# Patient Record
Sex: MALE | Race: WHITE | NOT HISPANIC OR LATINO | Employment: OTHER | URBAN - METROPOLITAN AREA
[De-identification: names, ages, dates, MRNs, and addresses within clinical notes are randomized per-mention and may not be internally consistent; named-entity substitution may affect disease eponyms.]

---

## 2022-03-25 LAB
CHOLESTEROL (EXTERNAL): 184 MG/DL (ref 140–199)
HDLC SERPL-MCNC: 30 MG/DL (ref 40–59)
LDL CHOLESTEROL CALCULATED (EXTERNAL): 89 MG/DL (ref 30–99)
TRIGLYCERIDES (EXTERNAL): 327 MG/DL (ref 10–149)

## 2022-11-28 ENCOUNTER — TRANSFERRED RECORDS (OUTPATIENT)
Dept: HEALTH INFORMATION MANAGEMENT | Facility: CLINIC | Age: 62
End: 2022-11-28

## 2023-05-04 ENCOUNTER — TRANSFERRED RECORDS (OUTPATIENT)
Dept: HEALTH INFORMATION MANAGEMENT | Facility: CLINIC | Age: 63
End: 2023-05-04

## 2023-12-14 ENCOUNTER — OFFICE VISIT (OUTPATIENT)
Dept: FAMILY MEDICINE | Facility: CLINIC | Age: 63
End: 2023-12-14
Payer: COMMERCIAL

## 2023-12-14 VITALS
BODY MASS INDEX: 32.86 KG/M2 | OXYGEN SATURATION: 97 % | RESPIRATION RATE: 24 BRPM | DIASTOLIC BLOOD PRESSURE: 85 MMHG | HEIGHT: 68 IN | TEMPERATURE: 97.5 F | SYSTOLIC BLOOD PRESSURE: 136 MMHG | HEART RATE: 86 BPM | WEIGHT: 216.8 LBS

## 2023-12-14 DIAGNOSIS — E11.42 TYPE 2 DIABETES MELLITUS WITH DIABETIC POLYNEUROPATHY, WITH LONG-TERM CURRENT USE OF INSULIN (H): Primary | ICD-10-CM

## 2023-12-14 DIAGNOSIS — I25.10 CORONARY ARTERY DISEASE DUE TO LIPID RICH PLAQUE: ICD-10-CM

## 2023-12-14 DIAGNOSIS — E78.5 HYPERLIPIDEMIA LDL GOAL <100: ICD-10-CM

## 2023-12-14 DIAGNOSIS — Z79.4 TYPE 2 DIABETES MELLITUS WITH DIABETIC POLYNEUROPATHY, WITH LONG-TERM CURRENT USE OF INSULIN (H): Primary | ICD-10-CM

## 2023-12-14 DIAGNOSIS — I10 HYPERTENSION GOAL BP (BLOOD PRESSURE) < 140/90: ICD-10-CM

## 2023-12-14 DIAGNOSIS — I25.83 CORONARY ARTERY DISEASE DUE TO LIPID RICH PLAQUE: ICD-10-CM

## 2023-12-14 DIAGNOSIS — M1A.9XX0 CHRONIC GOUT WITHOUT TOPHUS, UNSPECIFIED CAUSE, UNSPECIFIED SITE: ICD-10-CM

## 2023-12-14 DIAGNOSIS — K21.00 GASTROESOPHAGEAL REFLUX DISEASE WITH ESOPHAGITIS, UNSPECIFIED WHETHER HEMORRHAGE: ICD-10-CM

## 2023-12-14 DIAGNOSIS — R06.02 SOB (SHORTNESS OF BREATH): ICD-10-CM

## 2023-12-14 PROCEDURE — 91320 SARSCV2 VAC 30MCG TRS-SUC IM: CPT | Performed by: FAMILY MEDICINE

## 2023-12-14 PROCEDURE — 90471 IMMUNIZATION ADMIN: CPT | Performed by: FAMILY MEDICINE

## 2023-12-14 PROCEDURE — 99204 OFFICE O/P NEW MOD 45 MIN: CPT | Mod: 25 | Performed by: FAMILY MEDICINE

## 2023-12-14 PROCEDURE — 90686 IIV4 VACC NO PRSV 0.5 ML IM: CPT | Performed by: FAMILY MEDICINE

## 2023-12-14 PROCEDURE — 90480 ADMN SARSCOV2 VAC 1/ONLY CMP: CPT | Performed by: FAMILY MEDICINE

## 2023-12-14 RX ORDER — INSULIN ASPART 100 [IU]/ML
INJECTION, SOLUTION INTRAVENOUS; SUBCUTANEOUS
COMMUNITY
Start: 2023-05-31 | End: 2024-01-29

## 2023-12-14 RX ORDER — GLIPIZIDE 5 MG/1
15 TABLET, FILM COATED, EXTENDED RELEASE ORAL DAILY
COMMUNITY
Start: 2023-12-13 | End: 2024-09-05

## 2023-12-14 RX ORDER — DULAGLUTIDE 3 MG/.5ML
INJECTION, SOLUTION SUBCUTANEOUS
COMMUNITY
Start: 2023-10-11 | End: 2024-02-23

## 2023-12-14 RX ORDER — ASPIRIN 81 MG/1
81 TABLET ORAL DAILY
COMMUNITY

## 2023-12-14 RX ORDER — ISOSORBIDE MONONITRATE 30 MG/1
TABLET, EXTENDED RELEASE ORAL
COMMUNITY
Start: 2023-12-13 | End: 2024-01-29

## 2023-12-14 RX ORDER — GABAPENTIN 400 MG/1
CAPSULE ORAL
COMMUNITY
Start: 2023-12-13 | End: 2024-04-21

## 2023-12-14 RX ORDER — PREGABALIN 150 MG/1
CAPSULE ORAL
COMMUNITY
Start: 2023-12-13

## 2023-12-14 RX ORDER — DULOXETIN HYDROCHLORIDE 60 MG/1
CAPSULE, DELAYED RELEASE ORAL
COMMUNITY
Start: 2023-12-13 | End: 2024-01-29

## 2023-12-14 RX ORDER — FENOFIBRATE 145 MG/1
TABLET, COATED ORAL
COMMUNITY
Start: 2023-12-13 | End: 2024-01-29

## 2023-12-14 RX ORDER — PEN NEEDLE, DIABETIC 31 GX3/16"
NEEDLE, DISPOSABLE MISCELLANEOUS
COMMUNITY
Start: 2023-10-01 | End: 2024-01-29

## 2023-12-14 RX ORDER — FLURBIPROFEN SODIUM 0.3 MG/ML
SOLUTION/ DROPS OPHTHALMIC
COMMUNITY
Start: 2023-12-13 | End: 2024-09-05

## 2023-12-14 RX ORDER — ATORVASTATIN CALCIUM 80 MG/1
TABLET, FILM COATED ORAL
COMMUNITY
Start: 2023-12-13 | End: 2024-01-29

## 2023-12-14 RX ORDER — LORATADINE 10 MG/1
TABLET ORAL
COMMUNITY
Start: 2023-12-13

## 2023-12-14 RX ORDER — INSULIN GLARGINE 100 [IU]/ML
50 INJECTION, SOLUTION SUBCUTANEOUS 2 TIMES DAILY
COMMUNITY
Start: 2023-12-13 | End: 2024-01-29

## 2023-12-14 RX ORDER — ALBUTEROL SULFATE 90 UG/1
AEROSOL, METERED RESPIRATORY (INHALATION)
COMMUNITY
Start: 2023-12-13 | End: 2024-01-29

## 2023-12-14 RX ORDER — CYANOCOBALAMIN 1000 UG/ML
INJECTION, SOLUTION INTRAMUSCULAR; SUBCUTANEOUS
COMMUNITY
Start: 2023-12-13

## 2023-12-14 RX ORDER — ALLOPURINOL 100 MG/1
TABLET ORAL
COMMUNITY
Start: 2023-12-13 | End: 2024-01-29

## 2023-12-14 ASSESSMENT — PAIN SCALES - GENERAL: PAINLEVEL: SEVERE PAIN (7)

## 2023-12-14 NOTE — PROGRESS NOTES
ASSESSMENT / PLAN:  (E11.42,  Z79.4) Type 2 diabetes mellitus with diabetic polyneuropathy, with long-term current use of insulin (H)  (primary encounter diagnosis)  Comment: needs help  Plan: AMB Adult Diabetes Educator Referral        Will ask for dm ed help and possible continuous monitoring. Recheck in 4 months  For labs/etc    (I25.10,  I25.83) Coronary artery disease due to lipid rich plaque  Comment: some shortness of breath and needs new cardiology  Plan: Adult Cardiology Eval  Referral        Worsening shortness of breath/ chest pain to er. Tighter dm control    (R06.02) SOB (shortness of breath)  Comment: possibly out of shape  Plan: Adult Cardiology Eval  Referral        To ER if worse     (M1A.9XX0) Chronic gout without tophus, unspecified cause, unspecified site  Comment: stable  Plan: continue allopurinol    (I10) Hypertension goal BP (blood pressure) < 140/90  Comment: a little high  Plan: self-monitor. Continue meds    (E78.5) Hyperlipidemia LDL goal <100  Comment: on lipitor  Plan: Recheck in 4 months      (K21.00) Gastroesophageal reflux disease with esophagitis, unspecified whether hemorrhage  Comment: stable  Plan: avoid pop/ ALCOHOL       Tania Hurtado is a 63 year old, presenting for the following health issues:  New to clinic/myself.  History obesity, dm, htn, neuropathy, gerd, gout and high cholesterol.    and now living with son in [place]. From [facility] [place].  Dm ed in past. Outside blood sugars 200s. No <50 or LOSS OF CONSCIENCE.  Eye exam one year ago - no changes. Dm 10 years.  History MI/cva in past 10 years ago.   Chronic shortness of breath issues. No acute chest pain.   Needs new cardiology. No nausea, vomiting or diarrhea or black/bloody stools.   No urien changes or hematuria.  Emotionally doing ok. 4 kids. No gout in 8months gerd stable.  Meds just filled. Labs 3months.   Moved one month ago.   Establish Care and Recheck Medication      12/14/2023      "9:22 AM   Additional Questions   Roomed by MCKENNA Ocampo CMA       History of Present Illness       Diabetes:   He presents for follow up of diabetes.  He is checking home blood glucose four or more times daily.   He checks blood glucose before and after meals.  Blood glucose is sometimes over 200 and never under 70. He is aware of hypoglycemia symptoms including dizziness.    He has no concerns regarding his diabetes at this time.  He is having numbness in feet, burning in feet and excessive thirst.            Reason for visit:  Check up    He eats 2-3 servings of fruits and vegetables daily.He consumes 0 sweetened beverage(s) daily.He exercises with enough effort to increase his heart rate 20 to 29 minutes per day.  He exercises with enough effort to increase his heart rate 5 days per week.   He is taking medications regularly.           Objective    /85   Pulse 86   Temp 97.5  F (36.4  C) (Oral)   Resp 24   Ht 1.727 m (5' 8\")   Wt 98.3 kg (216 lb 12.8 oz)   SpO2 97%   BMI 32.96 kg/m     Physical Exam   GENERAL: healthy, alert and no distress  EYES: Eyes grossly normal to inspection, PERRL and conjunctivae and sclerae normal  NECK: no adenopathy, no asymmetry, masses, or scars and thyroid normal to palpation  RESP: lungs clear to auscultation - no rales, rhonchi or wheezes  CV: regular rate and rhythm, normal S1 S2, no S3 or S4, no murmur, click or rub, no peripheral edema and peripheral pulses strong  ABDOMEN: soft, nontender, no hepatosplenomegaly, no masses and bowel sounds normal  MS: no gross musculoskeletal defects noted, no edema  PSYCH: mentation appears normal, affect normal/bright                "

## 2023-12-31 ENCOUNTER — HEALTH MAINTENANCE LETTER (OUTPATIENT)
Age: 63
End: 2023-12-31

## 2024-01-04 ENCOUNTER — OFFICE VISIT (OUTPATIENT)
Dept: CARDIOLOGY | Facility: CLINIC | Age: 64
End: 2024-01-04
Attending: FAMILY MEDICINE
Payer: COMMERCIAL

## 2024-01-04 VITALS
SYSTOLIC BLOOD PRESSURE: 168 MMHG | HEART RATE: 86 BPM | BODY MASS INDEX: 32.99 KG/M2 | DIASTOLIC BLOOD PRESSURE: 93 MMHG | WEIGHT: 217 LBS | OXYGEN SATURATION: 95 %

## 2024-01-04 DIAGNOSIS — R06.02 SOB (SHORTNESS OF BREATH): ICD-10-CM

## 2024-01-04 DIAGNOSIS — I25.10 CORONARY ARTERY DISEASE DUE TO LIPID RICH PLAQUE: ICD-10-CM

## 2024-01-04 DIAGNOSIS — I25.83 CORONARY ARTERY DISEASE DUE TO LIPID RICH PLAQUE: ICD-10-CM

## 2024-01-04 PROCEDURE — 93000 ELECTROCARDIOGRAM COMPLETE: CPT | Performed by: INTERNAL MEDICINE

## 2024-01-04 PROCEDURE — 99204 OFFICE O/P NEW MOD 45 MIN: CPT | Performed by: INTERNAL MEDICINE

## 2024-01-04 NOTE — LETTER
1/4/2024      RE: Bryant Buck  [address] [address] [facility] [facility] [address]       Dear Colleague,    Thank you for the opportunity to participate in the care of your patient, Bryant Buck, at the [facility] . Please see a copy of my visit note below.       SUBJECTIVE:  Bryant Buck is a 63 year old male who presents for establishing cardiac care.  Patient is moving from [place] to [place] to live with his son and daughter-in-law.  According the patient about 4 years ago he was admitted to a small hospital in [place] and he was told that he had a heart attack.  No intervention or any other action was taken.  Patient was discharged home next day.  Subsequently patient met her cardiologist but no definitive diagnosis was made.  Patient's cardiac risk factors are smoking which he quit 10 years ago, do have COPD, type 2 diabetes for over 8 years hypertension and hyperlipidemia.  Family history is unavailable as patient went through [employer] and the parents he thought and not his real parents.  His only current symptom is dyspnea on exertion which is progressive.  For the past 3 years  Patient is not very active due to diabetes related peripheral neuropathy.    Patient Active Problem List    Diagnosis Date Noted     Chronic gout without tophus, unspecified cause, unspecified site 12/14/2023     Priority: Medium     Coronary artery disease due to lipid rich plaque 12/14/2023     Priority: Medium     Hypertension goal BP (blood pressure) < 140/90 12/14/2023     Priority: Medium     Hyperlipidemia LDL goal <100 12/14/2023     Priority: Medium     Gastroesophageal reflux disease with esophagitis, unspecified whether hemorrhage 12/14/2023     Priority: Medium    .  Current Outpatient Medications   Medication Sig     allopurinol (ZYLOPRIM) 100 MG tablet      aspirin 81 MG EC tablet Take 81 mg by mouth daily     atorvastatin  (LIPITOR) 80 MG tablet      B-D U/F insulin pen needle      CONTOUR NEXT TEST test strip      cyanocobalamin (CYANOCOBALAMIN) 1000 MCG/ML injection      DULoxetine (CYMBALTA) 60 MG capsule      fenofibrate (TRICOR) 145 MG tablet      gabapentin (NEURONTIN) 400 MG capsule      glipiZIDE (GLUCOTROL XL) 5 MG 24 hr tablet      isosorbide mononitrate (IMDUR) 30 MG 24 hr tablet      LANTUS SOLOSTAR 100 UNIT/ML soln      loratadine (CLARITIN) 10 MG tablet      metFORMIN (GLUCOPHAGE) 1000 MG tablet      NOVOLOG FLEXPEN 100 UNIT/ML soln INJECT 0-50 UNITS SUBQ THREE TIMES DAILY AS NEEDED PER SLIDING SCALE     omeprazole (PRILOSEC) 20 MG DR capsule      pregabalin (LYRICA) 150 MG capsule      SURE COMFORT PEN NEEDLES 31G X 8 MM miscellaneous USE TO INJECT INSULIN, SEVEN PER DAY     TRULICITY 3 MG/0.5ML SOPN INJECT 3MG SUBCUTANEOUSLY EVERY WEEK     VENTOLIN  (90 Base) MCG/ACT inhaler      No current facility-administered medications for this visit.     Past Medical History:   Diagnosis Date     COPD exacerbation (H)      Coronary artery disease      Diabetes (H)      History of stroke     2-3 strokes     Hyperlipidemia      Hypertension      Myocardial infarction (H)      Neuropathy      Sleep apnea      No past surgical history on file.  No Known Allergies  Social History     Socioeconomic History     Marital status:      Spouse name: Not on file     Number of children: Not on file     Years of education: Not on file     Highest education level: Not on file   Occupational History     Not on file   Tobacco Use     Smoking status: Former     Types: Cigarettes     Quit date:      Years since quittin.0     Smokeless tobacco: Never   Vaping Use     Vaping Use: Never used   Substance and Sexual Activity     Alcohol use: Not on file     Drug use: Not on file     Sexual activity: Not on file   Other Topics Concern     Not on file   Social History Narrative     Not on file     Social Determinants of Health      Financial Resource Strain: Low Risk  (12/14/2023)    Financial Resource Strain      Within the past 12 months, have you or your family members you live with been unable to get utilities (heat, electricity) when it was really needed?: No   Food Insecurity: Low Risk  (12/14/2023)    Food Insecurity      Within the past 12 months, did you worry that your food would run out before you got money to buy more?: No      Within the past 12 months, did the food you bought just not last and you didn t have money to get more?: Patient refused   Transportation Needs: Low Risk  (12/14/2023)    Transportation Needs      Within the past 12 months, has lack of transportation kept you from medical appointments, getting your medicines, non-medical meetings or appointments, work, or from getting things that you need?: No   Physical Activity: Not on file   Stress: Not on file   Social Connections: Not on file   Interpersonal Safety: Low Risk  (12/14/2023)    Interpersonal Safety      Do you feel physically and emotionally safe where you currently live?: Yes      Within the past 12 months, have you been hit, slapped, kicked or otherwise physically hurt by someone?: No      Within the past 12 months, have you been humiliated or emotionally abused in other ways by your partner or ex-partner?: No   Housing Stability: Low Risk  (12/14/2023)    Housing Stability      Do you have housing? : Yes      Are you worried about losing your housing?: No     No family history on file.       REVIEW OF SYSTEMS:  General: negative, fever, chills, night sweats  Skin: negative, acne, rash, and scaling  Eyes: negative, double vision, eye pain, and photophobia  Ears/Nose/Throat: negative, nasal congestion, and purulent rhinorrhea  Respiratory: No dyspnea on exertion, No cough, No hemoptysis, and negative  Cardiovascular: negative, palpitations, tachycardia, irregular heart beat, chest pain, exertional chest pain or pressure, paroxysmal nocturnal dyspnea,  dyspnea on exertion, and orthopnea         OBJECTIVE:  Blood pressure (!) 168/93, pulse 86, weight 98.4 kg (217 lb), SpO2 95%.  General Appearance: alert and no distress  Head: Normocephalic. No masses, lesions, tenderness or abnormalities  Eyes: conjuctiva clear, PERRL, EOM intact  Ears: External ears normal. Canals clear. TM's normal.  Nose: Nares normal  Mouth: normal  Neck: Supple, no cervical adenopathy, no thyromegaly  Lungs: clear to auscultation  Cardiac: regular rate and rhythm, normal S1 and S2, no murmur     ASSESSMENT/PLAN:  Patient here for establishing cardiac care.  He is moving from [place] to [place] to live with his son and daughter-in-law.  She gives a history that 4 years ago he was admitted to the very small [facility] and he was told that he had a heart attack.  No angiogram or any other testing was done at that time.  Patient was kept overnight and discharged home the next day.  Subsequently patient met a cardiologist but results of the evaluation is unavailable and patient do not know  Cardiac risk factors of hypertension, hyperlipidemia and type 2 diabetes severe peripheral neuropathy related to diabetes.  According to him he is a diabetic for past 8 years.  He quit smoking 10 years ago and do have COPD.  No significant records are available.  Today's EKG at the clinic showed normal sinus rhythm.  Low voltage otherwise unremarkable.  Patient need all baseline evaluation.  Will plan for an echocardiogram, Lexiscan to evaluate for prior history of MI and any ischemia as well as evaluation of dyspnea on exertion.  Also will check a lipid profile and also LFTs.  Patient will be contacted with the test result.  He is advised to keep an yearly follow-up.  Total visit duration 45 minutes.  This includes face-to-face interview, physical exam, chart review, review of available lab results, today's EKG and documentation.      Please do not hesitate to contact me if you have any  questions/concerns.     Sincerely,     MCKENNA Shannon MD

## 2024-01-04 NOTE — PATIENT INSTRUCTIONS
Thank you for coming to the [facility]; please note the following instructions:    1. Echocardiogram to evaluate heart structure and heart pumping function    2.  Lexiscan nuclear stress test to rule out coronary artery blockage    3.  Fasting labs to check cholesterol and liver function    4.  Results will determine if additional testing is needed otherwise follow-up in 1 year        If you have any questions regarding your visit please contact your care team:     Cardiology  Telephone Number   Charlene PALACIOS., RN  Herminia PFEIFFER, RN  Jennifer KELLY, RN  Diann MURRAY, DONOVAN AMNTILLA, DONOVAN KHALIL, Visit Facilitator  Nicol MOREL [facility] [phone] (option 1)   For scheduling appts:     [phone] (select option 1)       For the Device Clinic (Pacemakers and ICD's)  RN's :  Stephanie Jernigan   During business hours: [phone]    *After business hours:  [phone] (select option 4)      Normal test result notifications will be released via [employer] or mailed within 7 business days.  All other test results, will be communicated via telephone once reviewed by your cardiologist.    If you need a medication refill please contact your pharmacy.  Please allow 3 business days for your refill to be completed.    As always, thank you for trusting us with your health care needs!

## 2024-01-04 NOTE — PROGRESS NOTES
SUBJECTIVE:  Bryant Buck is a 63 year old male who presents for establishing cardiac care.  Patient is moving from [place] to [place] to live with his son and daughter-in-law.  According the patient about 4 years ago he was admitted to a small hospital in [place] and he was told that he had a heart attack.  No intervention or any other action was taken.  Patient was discharged home next day.  Subsequently patient met her cardiologist but no definitive diagnosis was made.  Patient's cardiac risk factors are smoking which he quit 10 years ago, do have COPD, type 2 diabetes for over 8 years hypertension and hyperlipidemia.  Family history is unavailable as patient went through [employer] and the parents he thought and not his real parents.  His only current symptom is dyspnea on exertion which is progressive.  For the past 3 years  Patient is not very active due to diabetes related peripheral neuropathy.    Patient Active Problem List    Diagnosis Date Noted    Chronic gout without tophus, unspecified cause, unspecified site 12/14/2023     Priority: Medium    Coronary artery disease due to lipid rich plaque 12/14/2023     Priority: Medium    Hypertension goal BP (blood pressure) < 140/90 12/14/2023     Priority: Medium    Hyperlipidemia LDL goal <100 12/14/2023     Priority: Medium    Gastroesophageal reflux disease with esophagitis, unspecified whether hemorrhage 12/14/2023     Priority: Medium    .  Current Outpatient Medications   Medication Sig    allopurinol (ZYLOPRIM) 100 MG tablet     aspirin 81 MG EC tablet Take 81 mg by mouth daily    atorvastatin (LIPITOR) 80 MG tablet     B-D U/F insulin pen needle     CONTOUR NEXT TEST test strip     cyanocobalamin (CYANOCOBALAMIN) 1000 MCG/ML injection     DULoxetine (CYMBALTA) 60 MG capsule     fenofibrate (TRICOR) 145 MG tablet     gabapentin (NEURONTIN) 400 MG capsule     glipiZIDE (GLUCOTROL XL) 5 MG 24 hr tablet     isosorbide mononitrate (IMDUR) 30 MG  24 hr tablet     LANTUS SOLOSTAR 100 UNIT/ML soln     loratadine (CLARITIN) 10 MG tablet     metFORMIN (GLUCOPHAGE) 1000 MG tablet     NOVOLOG FLEXPEN 100 UNIT/ML soln INJECT 0-50 UNITS SUBQ THREE TIMES DAILY AS NEEDED PER SLIDING SCALE    omeprazole (PRILOSEC) 20 MG DR capsule     pregabalin (LYRICA) 150 MG capsule     SURE COMFORT PEN NEEDLES 31G X 8 MM miscellaneous USE TO INJECT INSULIN, SEVEN PER DAY    TRULICITY 3 MG/0.5ML SOPN INJECT 3MG SUBCUTANEOUSLY EVERY WEEK    VENTOLIN  (90 Base) MCG/ACT inhaler      No current facility-administered medications for this visit.     Past Medical History:   Diagnosis Date    COPD exacerbation (H)     Coronary artery disease     Diabetes (H)     History of stroke     2-3 strokes    Hyperlipidemia     Hypertension     Myocardial infarction (H)     Neuropathy     Sleep apnea      No past surgical history on file.  No Known Allergies  Social History     Socioeconomic History    Marital status:      Spouse name: Not on file    Number of children: Not on file    Years of education: Not on file    Highest education level: Not on file   Occupational History    Not on file   Tobacco Use    Smoking status: Former     Types: Cigarettes     Quit date:      Years since quittin.0    Smokeless tobacco: Never   Vaping Use    Vaping Use: Never used   Substance and Sexual Activity    Alcohol use: Not on file    Drug use: Not on file    Sexual activity: Not on file   Other Topics Concern    Not on file   Social History Narrative    Not on file     Social Determinants of Health     Financial Resource Strain: Low Risk  (2023)    Financial Resource Strain     Within the past 12 months, have you or your family members you live with been unable to get utilities (heat, electricity) when it was really needed?: No   Food Insecurity: Low Risk  (2023)    Food Insecurity     Within the past 12 months, did you worry that your food would run out before you got money to  buy more?: No     Within the past 12 months, did the food you bought just not last and you didn t have money to get more?: Patient refused   Transportation Needs: Low Risk  (12/14/2023)    Transportation Needs     Within the past 12 months, has lack of transportation kept you from medical appointments, getting your medicines, non-medical meetings or appointments, work, or from getting things that you need?: No   Physical Activity: Not on file   Stress: Not on file   Social Connections: Not on file   Interpersonal Safety: Low Risk  (12/14/2023)    Interpersonal Safety     Do you feel physically and emotionally safe where you currently live?: Yes     Within the past 12 months, have you been hit, slapped, kicked or otherwise physically hurt by someone?: No     Within the past 12 months, have you been humiliated or emotionally abused in other ways by your partner or ex-partner?: No   Housing Stability: Low Risk  (12/14/2023)    Housing Stability     Do you have housing? : Yes     Are you worried about losing your housing?: No     No family history on file.       REVIEW OF SYSTEMS:  General: negative, fever, chills, night sweats  Skin: negative, acne, rash, and scaling  Eyes: negative, double vision, eye pain, and photophobia  Ears/Nose/Throat: negative, nasal congestion, and purulent rhinorrhea  Respiratory: No dyspnea on exertion, No cough, No hemoptysis, and negative  Cardiovascular: negative, palpitations, tachycardia, irregular heart beat, chest pain, exertional chest pain or pressure, paroxysmal nocturnal dyspnea, dyspnea on exertion, and orthopnea         OBJECTIVE:  Blood pressure (!) 168/93, pulse 86, weight 98.4 kg (217 lb), SpO2 95%.  General Appearance: alert and no distress  Head: Normocephalic. No masses, lesions, tenderness or abnormalities  Eyes: conjuctiva clear, PERRL, EOM intact  Ears: External ears normal. Canals clear. TM's normal.  Nose: Nares normal  Mouth: normal  Neck: Supple, no cervical  adenopathy, no thyromegaly  Lungs: clear to auscultation  Cardiac: regular rate and rhythm, normal S1 and S2, no murmur     ASSESSMENT/PLAN:  Patient here for establishing cardiac care.  He is moving from [place] to [place] to live with his son and daughter-in-law.  She gives a history that 4 years ago he was admitted to the very small [facility] and he was told that he had a heart attack.  No angiogram or any other testing was done at that time.  Patient was kept overnight and discharged home the next day.  Subsequently patient met a cardiologist but results of the evaluation is unavailable and patient do not know  Cardiac risk factors of hypertension, hyperlipidemia and type 2 diabetes severe peripheral neuropathy related to diabetes.  According to him he is a diabetic for past 8 years.  He quit smoking 10 years ago and do have COPD.  No significant records are available.  Today's EKG at the clinic showed normal sinus rhythm.  Low voltage otherwise unremarkable.  Patient need all baseline evaluation.  Will plan for an echocardiogram, Lexiscan to evaluate for prior history of MI and any ischemia as well as evaluation of dyspnea on exertion.  Also will check a lipid profile and also LFTs.  Patient will be contacted with the test result.  He is advised to keep an yearly follow-up.  Total visit duration 45 minutes.  This includes face-to-face interview, physical exam, chart review, review of available lab results, today's EKG and documentation.

## 2024-01-04 NOTE — NURSING NOTE
"Chief Complaint   Patient presents with    Coronary Artery Disease    Shortness of Breath     New general cardiology for CAD due to lipid rich plaque and SOB       Initial BP (!) 168/93 (BP Location: Left arm, Patient Position: Chair, Cuff Size: Adult Regular)   Pulse 86   Wt 98.4 kg (217 lb)   SpO2 95%   BMI 32.99 kg/m   Estimated body mass index is 32.99 kg/m  as calculated from the following:    Height as of 12/14/23: 1.727 m (5' 8\").    Weight as of this encounter: 98.4 kg (217 lb)..  BP completed using cuff size: DONOVAN Hernandez    "

## 2024-01-05 ENCOUNTER — VIRTUAL VISIT (OUTPATIENT)
Dept: EDUCATION SERVICES | Facility: CLINIC | Age: 64
End: 2024-01-05
Attending: FAMILY MEDICINE
Payer: COMMERCIAL

## 2024-01-05 DIAGNOSIS — E11.42 TYPE 2 DIABETES MELLITUS WITH DIABETIC POLYNEUROPATHY, WITH LONG-TERM CURRENT USE OF INSULIN (H): ICD-10-CM

## 2024-01-05 DIAGNOSIS — Z79.4 TYPE 2 DIABETES MELLITUS WITH DIABETIC POLYNEUROPATHY, WITH LONG-TERM CURRENT USE OF INSULIN (H): ICD-10-CM

## 2024-01-05 PROCEDURE — G0108 DIAB MANAGE TRN  PER INDIV: HCPCS | Mod: 95 | Performed by: DIETITIAN, REGISTERED

## 2024-01-05 RX ORDER — BLOOD-GLUCOSE SENSOR
1 EACH MISCELLANEOUS
Qty: 2 EACH | Refills: 5 | Status: SHIPPED | OUTPATIENT
Start: 2024-01-05 | End: 2024-02-23

## 2024-01-05 NOTE — PROGRESS NOTES
"Diabetes Self-Management Education & Support    Presents for: Individual review    Type of service:  Video Visit    If the video visit is dropped, the video visit invitation should be resent by: Text to cell phone: [phone]    Originating Location (pt. Location): Home  Distant Location (provider location): Offsite  Mode of Communication:  Video Conference via [employer]    Video Start Time:  10:00  Video End Time (time video stopped): 10:45    How would patient like to obtain AVS? MyChart    Date Breakfast  Lunch  Dinner  Bedtime    Before After Before After Before After    1/5 355         1/4 356  304 311 332 413 330   1/3 272  230 167 264 142 226   1/2 247  227 247 221 247 227   1/1 233  226 373 314 225    12/31 200  212 220 217 214 224   12/30 185  229 264 274 227        ASSESSMENT:  Pt reports taking 100 units Lantus once daily (80 and 20 units in separate injections). Has sliding scale Novolog + Glipize + Metformin + Trulicity (waiting for prior auth or refill). Has had extensive DM ed in the past, but states he is \"insulin resistant\". Has been losing weight, and reports blood sugars now improved from 400- 600 mg/dl. Recommend pt split Lantus to 50 units morning and bedtime. Also recommend continuous glucose monitoring, Susu 3 CGM ordered for pt today. Pt has been eating smaller portions, eats gluten free and lower carb. Follow up planned for review of BG data. Will then consider whether Novolog before each meal may be beneficial.     CGM-specific education:   Freestyle Susu sensor: insertion technique, sensor site location and rotation, insulin administration in relation to sensor placement, sensor wear, reasons to remove sensor (MRI, CT, diathermy), Vitamin C & Aspirin effects on sensor, Susu Burton: frequency of scanning sensor, length of time data is visible, use of built in glucose meter, Precision X-tra test strips, Use of trends and graphs for pattern management and problem solving, Dosing " "insulin based on sensor glucose results, and [employer] software       Pt verbalized understanding of concepts discussed and recommendations provided today.    Continue education with the following diabetes management concepts: Monitoring, Taking Medication, and Problem Solving      PLAN    Topics to cover at upcoming visits: Monitoring and Problem Solving    Follow-up: 2 month    See Care Plan for co-developed, patient-state behavior change goals.  AVS provided for patient today.    Education Materials Provided:  Referred to [email or web address]      SUBJECTIVE/OBJECTIVE:  Presents for: Individual review  Accompanied by: Self  Diabetes education in the past 24mo: Yes  Focus of Visit: Problem Solving  Diabetes type: Type 2  Date of diagnosis: 10 yrs ago  Disease course: Improving  How confident are you filling out medical forms by yourself:: Not Assessed  Other concerns:: None  Cultural Influences/Ethnic Background:  Not  or     Diabetes Symptoms & Complications:  Weight trend: Decreasing  Complications assessed today?: Yes  Peripheral neuropathy: Yes    Patient Problem List and Family Medical History reviewed for relevant medical history, current medical status, and diabetes risk factors.    Vitals:  There were no vitals taken for this visit.  Estimated body mass index is 32.99 kg/m  as calculated from the following:    Height as of 12/14/23: 1.727 m (5' 8\").    Weight as of 1/4/24: 98.4 kg (217 lb).   Last 3 BP:   BP Readings from Last 3 Encounters:   01/04/24 (!) 168/93   12/14/23 136/85       History   Smoking Status    Former    Types: Cigarettes    Quit date: 2013   Smokeless Tobacco    Never       Labs:  No results found for: \"A1C\", \"HEMOGLOBINA1\"  No results found for: \"GLC\"  No results found for: \"LDL\"  No results found for: \"HDL\"]  No results found for: \"GFRESTIMATED\"  No results found for: \"GFRESTBLACK\"  No results found for: \"CR\"  No results found for: \"MICROALBUMIN\"    Healthy Eating:  Healthy Eating " Assessed Today: Yes  Meal planning/habits: Avoiding sweets, Other  Meals include: Breakfast, Lunch, Dinner  Breakfast: 1 slice gluten free toast with peanut butter  Lunch: salad or other vegetable,  apple or peach  Dinner: lentils, vegetables or salad  Beverages: Water, Tea  Has patient met with a dietitian in the past?: Yes    Being Active:  Being Active Assessed Today: Yes  Exercise:: Currently not exercising  Barrier to exercise: Physical limitation    Monitoring:  Monitoring Assessed Today: Yes  Times checking blood sugar at home (number): 5+  Times checking blood sugar at home (per): Day  Blood glucose trend: Decreasing    Taking Medications:  Diabetes Medication(s)       Biguanides       metFORMIN (GLUCOPHAGE) 1000 MG tablet        Insulin       LANTUS SOLOSTAR 100 UNIT/ML soln      NOVOLOG FLEXPEN 100 UNIT/ML soln INJECT 0-50 UNITS SUBQ THREE TIMES DAILY AS NEEDED PER SLIDING SCALE       Sulfonylureas       glipiZIDE (GLUCOTROL XL) 5 MG 24 hr tablet        Incretin Mimetic Agents       TRULICITY 3 MG/0.5ML SOPN INJECT 3MG SUBCUTANEOUSLY EVERY WEEK          Taking Medication Assessed Today: Yes  Current Treatments: Insulin Injections, Non-insulin Injectables, Oral Medication (taken by mouth)    Problem Solving:  Problem Solving Assessed Today: Yes  Is the patient at risk for hypoglycemia?: Yes  Hypoglycemia Frequency: Never    Reducing Risks:  Reducing Risks Assessed Today: No    Healthy Coping:  Healthy Coping Assessed Today: Yes  Emotional response to diabetes: Concern for health and well-being, Ready to learn  Stage of change: ACTION (Actively working towards change)  Patient Activation Measure Survey Score:       No data to display                Care Plan and Education Provided:  Care Plan: Diabetes   Updates made by Gretel Ferreira RD since 1/5/2024 12:00 AM        Problem: HbA1C Not In Goal         Goal: Establish Regular Follow-Ups with PCP         Task: Discuss with PCP the recommended timing for  patient's next follow up visit(s)    Responsible User: Gretel Ferreira RD        Task: Discuss schedule for PCP visits with patient    Responsible User: Gretel Ferreira RD        Goal: Get HbA1C Level in Goal         Task: Educate patient on diabetes education self-management topics    Responsible User: Gretel Ferreira RD        Task: Educate patient on benefits of regular glucose monitoring    Responsible User: Gretel Ferreira RD        Task: Refer patient to appropriate extended care team member, as needed (Medication Therapy Management, Behavioral Health, Physical Therapy, etc.)    Responsible User: Gretel Ferreira RD        Task: Discuss diabetes treatment plan with patient    Responsible User: Gretel Ferreira RD        Problem: Diabetes Self-Management Education Needed to Optimize Self-Care Behaviors         Goal: Understand diabetes pathophysiology and disease progression         Task: Provide education on diabetes pathophysiology and disease progression specfic to patient's diabetes type    Responsible User: Gretel Ferreira RD        Goal: Healthy Eating - follow a healthy eating pattern for diabetes         Task: Provide education on portion control and consistency in amount, composition and timing of food intake    Responsible User: Gretel Ferreira RD        Task: Provide education on managing carbohydrate intake (carbohydrate counting, plate planning method, etc.)    Responsible User: Gretel Ferreira RD        Task: Provide education on weight management    Responsible User: Gretel Ferreira RD        Task: Provide education on heart healthy eating    Responsible User: Gretel Ferreira RD        Task: Provide education on eating out    Responsible User: Gretel Ferreira RD        Task: Develop individualized healthy eating plan with patient    Responsible User: Gretel Ferreira RD        Goal: Being Active - get regular physical activity, working up to at least 150 minutes per week         Task:  Provide education on relationship of activity to glucose and precautions to take if at risk for low glucose    Responsible User: Gretel Ferreira RD        Task: Discuss barriers to physical activity with patient    Responsible User: Gretel Ferreira RD        Task: Develop physical activity plan with patient    Responsible User: Gretel Ferreira RD        Task: Explore community resources including walking groups, assistance programs, and home videos    Responsible User: Gretel Ferreira RD        Goal: Monitoring - monitor glucose and ketones as directed         Task: Provide education on blood glucose monitoring (purpose, proper technique, frequency, glucose targets, interpreting results, when to use glucose control solution, sharps disposal)    Responsible User: Gretel Ferreira RD        Task: Provide education on continuous glucose monitoring (sensor placement, use of seema or /reader, understanding glucose trends, alerts and alarms, differences between sensor glucose and blood glucose) Completed 1/5/2024   Responsible User: Gretel Ferreira RD        Task: Provide education on ketone monitoring (when to monitor, frequency, etc.)    Responsible User: Gretel Ferreira RD        Goal: Taking Medication - patient is consistently taking medications as directed         Task: Provide education on action of prescribed medication, including when to take and possible side effects    Responsible User: Gretel Ferreira RD        Task: Provide education on insulin and injectable diabetes medications, including administration, storage, site selection and rotation for injection sites Completed 1/5/2024   Responsible User: Gretel Ferreira RD        Task: Discuss barriers to medication adherence with patient and provide management technique ideas as appropriate    Responsible User: Gretel Ferreira RD        Task: Provide education on frequency and refill details of medications    Responsible User: Gretel Ferreira RD         Goal: Problem Solving - know how to prevent and manage short-term diabetes complications         Task: Provide education on high blood glucose - causes, signs/symptoms, prevention and treatment Completed 1/5/2024   Responsible User: Gretel Ferreira RD        Task: Provide education on low blood glucose - causes, signs/symptoms, prevention, treatment, carrying a carbohydrate source at all times, and medical identification    Responsible User: Gretel Ferreira RD        Task: Provide education on safe travel with diabetes    Responsible User: Gretel Ferreira RD        Task: Provide education on how to care for diabetes on sick days    Responsible User: Gretel Ferreira RD        Task: Provide education on when to call a health care provider    Responsible User: Gretel Ferreira RD        Goal: Reducing Risks - know how to prevent and treat long-term diabetes complications         Task: Provide education on major complications of diabetes, prevention, early diagnostic measures and treatment of complications    Responsible User: Gretel Ferreira RD        Task: Provide education on recommended care for dental, eye and foot health    Responsible User: Gretel Ferreira RD        Task: Provide education on Hemoglobin A1c - goals and relationship to blood glucose levels    Responsible User: Gretel Ferreira RD        Task: Provide education on recommendations for heart health - lipid levels and goals, blood pressure and goals, and aspirin therapy, if indicated    Responsible User: Gretel Ferreira RD        Task: Provide education on tobacco cessation    Responsible User: Gretel Ferreira RD        Goal: Healthy Coping - use available resources to cope with the challenges of managing diabetes         Task: Discuss recognizing feelings about having diabetes    Responsible User: Gretel Ferreira RD        Task: Provide education on the benefits of making appropriate lifestyle changes    Responsible User: Jhon  Gretel ANDRES RD        Task: Provide education on benefits of utilizing support systems    Responsible User: Gretel Ferreira RD        Task: Discuss methods for coping with stress    Responsible User: Gretel Ferreira RD        Task: Provide education on when to seek professional counseling    Responsible User: Gretel Ferreira RD Mary Spencer RD, [facility] [place]  Diabetes     Time Spent: 45 minutes  Encounter Type: Individual    Any diabetes medication dose changes were made via the CDE Protocol per the patient's primary care provider. A copy of this encounter was shared with the provider.

## 2024-01-05 NOTE — LETTER
"    1/5/2024         RE: Bryant Buck  [address] [facility] [address]        Dear Colleague,    Thank you for referring your patient, Bryant Buck, to the [facility]. Please see a copy of my visit note below.    Diabetes Self-Management Education & Support    Presents for: Individual review    Type of service:  Video Visit    If the video visit is dropped, the video visit invitation should be resent by: Text to cell phone: [phone]    Originating Location (pt. Location): Home  Distant Location (provider location): Offsite  Mode of Communication:  Video Conference via [employer] Start Time:  10:00  Video End Time (time video stopped): 10:45    How would patient like to obtain AVS? MyChart    Date Breakfast  Lunch  Dinner  Bedtime    Before After Before After Before After    1/5 355         1/4 356  304 311 332 413 330   1/3 272  230 167 264 142 226   1/2 247  227 247 221 247 227   1/1 233  226 373 314 225    12/31 200  212 220 217 214 224   12/30 185  229 264 274 227        ASSESSMENT:  Pt reports taking 100 units Lantus once daily (80 and 20 units in separate injections). Has sliding scale Novolog + Glipize + Metformin + Trulicity (waiting for prior auth or refill). Has had extensive DM ed in the past, but states he is \"insulin resistant\". Has been losing weight, and reports blood sugars now improved from 400- 600 mg/dl. Recommend pt split Lantus to 50 units morning and bedtime. Also recommend continuous glucose monitoring, Susu 3 CGM ordered for pt today. Pt has been eating smaller portions, eats gluten free and lower carb. Follow up planned for review of BG data. Will then consider whether Novolog before each meal may be beneficial.     CGM-specific education:   Freestyle Susu sensor: insertion technique, sensor site location and rotation, insulin administration in relation to sensor placement, sensor wear, reasons to remove sensor (MRI, CT, diathermy), " "Vitamin C & Aspirin effects on sensor, Susu [place]: frequency of scanning sensor, length of time data is visible, use of built in glucose meter, Precision X-tra test strips, Use of trends and graphs for pattern management and problem solving, Dosing insulin based on sensor glucose results, and [employer] software       Pt verbalized understanding of concepts discussed and recommendations provided today.    Continue education with the following diabetes management concepts: Monitoring, Taking Medication, and Problem Solving      PLAN    Topics to cover at upcoming visits: Monitoring and Problem Solving    Follow-up: 2 month    See Care Plan for co-developed, patient-state behavior change goals.  AVS provided for patient today.    Education Materials Provided:  Referred to [email or web address]      SUBJECTIVE/OBJECTIVE:  Presents for: Individual review  Accompanied by: Self  Diabetes education in the past 24mo: Yes  Focus of Visit: Problem Solving  Diabetes type: Type 2  Date of diagnosis: 10 yrs ago  Disease course: Improving  How confident are you filling out medical forms by yourself:: Not Assessed  Other concerns:: None  Cultural Influences/Ethnic Background:  Not  or     Diabetes Symptoms & Complications:  Weight trend: Decreasing  Complications assessed today?: Yes  Peripheral neuropathy: Yes    Patient Problem List and Family Medical History reviewed for relevant medical history, current medical status, and diabetes risk factors.    Vitals:  There were no vitals taken for this visit.  Estimated body mass index is 32.99 kg/m  as calculated from the following:    Height as of 12/14/23: 1.727 m (5' 8\").    Weight as of 1/4/24: 98.4 kg (217 lb).   Last 3 BP:   BP Readings from Last 3 Encounters:   01/04/24 (!) 168/93   12/14/23 136/85       History   Smoking Status     Former     Types: Cigarettes     Quit date: 2013   Smokeless Tobacco     Never       Labs:  No results found for: \"A1C\", \"HEMOGLOBINA1\"  No " "results found for: \"GLC\"  No results found for: \"LDL\"  No results found for: \"HDL\"]  No results found for: \"GFRESTIMATED\"  No results found for: \"GFRESTBLACK\"  No results found for: \"CR\"  No results found for: \"MICROALBUMIN\"    Healthy Eating:  Healthy Eating Assessed Today: Yes  Meal planning/habits: Avoiding sweets, Other  Meals include: Breakfast, Lunch, Dinner  Breakfast: 1 slice gluten free toast with peanut butter  Lunch: salad or other vegetable,  apple or peach  Dinner: lentils, vegetables or salad  Beverages: Water, Tea  Has patient met with a dietitian in the past?: Yes    Being Active:  Being Active Assessed Today: Yes  Exercise:: Currently not exercising  Barrier to exercise: Physical limitation    Monitoring:  Monitoring Assessed Today: Yes  Times checking blood sugar at home (number): 5+  Times checking blood sugar at home (per): Day  Blood glucose trend: Decreasing    Taking Medications:  Diabetes Medication(s)       Biguanides       metFORMIN (GLUCOPHAGE) 1000 MG tablet        Insulin       LANTUS SOLOSTAR 100 UNIT/ML soln      NOVOLOG FLEXPEN 100 UNIT/ML soln INJECT 0-50 UNITS SUBQ THREE TIMES DAILY AS NEEDED PER SLIDING SCALE       Sulfonylureas       glipiZIDE (GLUCOTROL XL) 5 MG 24 hr tablet        Incretin Mimetic Agents       TRULICITY 3 MG/0.5ML SOPN INJECT 3MG SUBCUTANEOUSLY EVERY WEEK          Taking Medication Assessed Today: Yes  Current Treatments: Insulin Injections, Non-insulin Injectables, Oral Medication (taken by mouth)    Problem Solving:  Problem Solving Assessed Today: Yes  Is the patient at risk for hypoglycemia?: Yes  Hypoglycemia Frequency: Never    Reducing Risks:  Reducing Risks Assessed Today: No    Healthy Coping:  Healthy Coping Assessed Today: Yes  Emotional response to diabetes: Concern for health and well-being, Ready to learn  Stage of change: ACTION (Actively working towards change)  Patient Activation Measure Survey Score:       No data to display                Care " Plan and Education Provided:  Care Plan: Diabetes   Updates made by Gretel Ferreira RD since 1/5/2024 12:00 AM        Problem: HbA1C Not In Goal         Goal: Establish Regular Follow-Ups with PCP         Task: Discuss with PCP the recommended timing for patient's next follow up visit(s)    Responsible User: Gretel Ferreira RD        Task: Discuss schedule for PCP visits with patient    Responsible User: Gretel Ferreira RD        Goal: Get HbA1C Level in Goal         Task: Educate patient on diabetes education self-management topics    Responsible User: Gretel Ferreira RD        Task: Educate patient on benefits of regular glucose monitoring    Responsible User: Gretel Ferreira RD        Task: Refer patient to appropriate extended care team member, as needed (Medication Therapy Management, Behavioral Health, Physical Therapy, etc.)    Responsible User: Gretel Ferreira RD        Task: Discuss diabetes treatment plan with patient    Responsible User: Gretel Ferreira RD        Problem: Diabetes Self-Management Education Needed to Optimize Self-Care Behaviors         Goal: Understand diabetes pathophysiology and disease progression         Task: Provide education on diabetes pathophysiology and disease progression specfic to patient's diabetes type    Responsible User: Gretel Ferreira RD        Goal: Healthy Eating - follow a healthy eating pattern for diabetes         Task: Provide education on portion control and consistency in amount, composition and timing of food intake    Responsible User: Gretel Ferreira RD        Task: Provide education on managing carbohydrate intake (carbohydrate counting, plate planning method, etc.)    Responsible User: Gretel Ferreira RD        Task: Provide education on weight management    Responsible User: Gretel Ferreira RD        Task: Provide education on heart healthy eating    Responsible User: Gretel Ferreira RD        Task: Provide education on eating out     Responsible User: Gretel Ferreira RD        Task: Develop individualized healthy eating plan with patient    Responsible User: Gretel Ferreira RD        Goal: Being Active - get regular physical activity, working up to at least 150 minutes per week         Task: Provide education on relationship of activity to glucose and precautions to take if at risk for low glucose    Responsible User: Gretel Ferreira RD        Task: Discuss barriers to physical activity with patient    Responsible User: Gretel Ferreira RD        Task: Develop physical activity plan with patient    Responsible User: Gretel Ferreira RD        Task: Explore community resources including walking groups, assistance programs, and home videos    Responsible User: Gretel Ferreira RD        Goal: Monitoring - monitor glucose and ketones as directed         Task: Provide education on blood glucose monitoring (purpose, proper technique, frequency, glucose targets, interpreting results, when to use glucose control solution, sharps disposal)    Responsible User: Gretel Ferreira RD        Task: Provide education on continuous glucose monitoring (sensor placement, use of seema or /reader, understanding glucose trends, alerts and alarms, differences between sensor glucose and blood glucose) Completed 1/5/2024   Responsible User: Gretel Ferreira RD        Task: Provide education on ketone monitoring (when to monitor, frequency, etc.)    Responsible User: Gretel Ferreira RD        Goal: Taking Medication - patient is consistently taking medications as directed         Task: Provide education on action of prescribed medication, including when to take and possible side effects    Responsible User: Gretel Ferreira RD        Task: Provide education on insulin and injectable diabetes medications, including administration, storage, site selection and rotation for injection sites Completed 1/5/2024   Responsible User: Gretel Ferreira RD        Task:  Discuss barriers to medication adherence with patient and provide management technique ideas as appropriate    Responsible User: Gretel Ferreira RD        Task: Provide education on frequency and refill details of medications    Responsible User: Gretel Ferreira RD        Goal: Problem Solving - know how to prevent and manage short-term diabetes complications         Task: Provide education on high blood glucose - causes, signs/symptoms, prevention and treatment Completed 1/5/2024   Responsible User: Gretel Ferreira RD        Task: Provide education on low blood glucose - causes, signs/symptoms, prevention, treatment, carrying a carbohydrate source at all times, and medical identification    Responsible User: Gretel Ferreira RD        Task: Provide education on safe travel with diabetes    Responsible User: Gretel Ferreira RD        Task: Provide education on how to care for diabetes on sick days    Responsible User: Gretel Ferreira RD        Task: Provide education on when to call a health care provider    Responsible User: Gretel Ferreira RD        Goal: Reducing Risks - know how to prevent and treat long-term diabetes complications         Task: Provide education on major complications of diabetes, prevention, early diagnostic measures and treatment of complications    Responsible User: Gretel Ferreira RD        Task: Provide education on recommended care for dental, eye and foot health    Responsible User: Gretel Ferreira RD        Task: Provide education on Hemoglobin A1c - goals and relationship to blood glucose levels    Responsible User: Gretel Ferreira RD        Task: Provide education on recommendations for heart health - lipid levels and goals, blood pressure and goals, and aspirin therapy, if indicated    Responsible User: Gretel Ferreira RD        Task: Provide education on tobacco cessation    Responsible User: Gretel Ferreira RD        Goal: Healthy Coping - use available resources to cope  with the challenges of managing diabetes         Task: Discuss recognizing feelings about having diabetes    Responsible User: Gretel Ferreira RD        Task: Provide education on the benefits of making appropriate lifestyle changes    Responsible User: Gretel Ferreira RD        Task: Provide education on benefits of utilizing support systems    Responsible User: Gretel Ferreira RD        Task: Discuss methods for coping with stress    Responsible User: Gretel Ferreira RD        Task: Provide education on when to seek professional counseling    Responsible User: Gretel Ferreira RD Mary Spencer RD, [facility]  Diabetes     Time Spent: 45 minutes  Encounter Type: Individual    Any diabetes medication dose changes were made via the CDE Protocol per the patient's primary care provider. A copy of this encounter was shared with the provider.

## 2024-01-06 LAB
ATRIAL RATE - MUSE: 83 BPM
DIASTOLIC BLOOD PRESSURE - MUSE: NORMAL MMHG
INTERPRETATION ECG - MUSE: NORMAL
P AXIS - MUSE: 48 DEGREES
PR INTERVAL - MUSE: 184 MS
QRS DURATION - MUSE: 94 MS
QT - MUSE: 366 MS
QTC - MUSE: 430 MS
R AXIS - MUSE: 44 DEGREES
SYSTOLIC BLOOD PRESSURE - MUSE: NORMAL MMHG
T AXIS - MUSE: 31 DEGREES
VENTRICULAR RATE- MUSE: 83 BPM

## 2024-01-11 ENCOUNTER — TELEPHONE (OUTPATIENT)
Dept: EDUCATION SERVICES | Facility: CLINIC | Age: 64
End: 2024-01-11

## 2024-01-11 ENCOUNTER — ANCILLARY PROCEDURE (OUTPATIENT)
Dept: CARDIOLOGY | Facility: CLINIC | Age: 64
End: 2024-01-11
Attending: INTERNAL MEDICINE
Payer: COMMERCIAL

## 2024-01-11 DIAGNOSIS — I25.10 CORONARY ARTERY DISEASE DUE TO LIPID RICH PLAQUE: ICD-10-CM

## 2024-01-11 DIAGNOSIS — E11.42 TYPE 2 DIABETES MELLITUS WITH DIABETIC POLYNEUROPATHY, WITH LONG-TERM CURRENT USE OF INSULIN (H): Primary | ICD-10-CM

## 2024-01-11 DIAGNOSIS — I25.83 CORONARY ARTERY DISEASE DUE TO LIPID RICH PLAQUE: ICD-10-CM

## 2024-01-11 DIAGNOSIS — R06.02 SOB (SHORTNESS OF BREATH): ICD-10-CM

## 2024-01-11 DIAGNOSIS — Z79.4 TYPE 2 DIABETES MELLITUS WITH DIABETIC POLYNEUROPATHY, WITH LONG-TERM CURRENT USE OF INSULIN (H): Primary | ICD-10-CM

## 2024-01-11 LAB — LVEF ECHO: NORMAL

## 2024-01-11 PROCEDURE — 93306 TTE W/DOPPLER COMPLETE: CPT | Performed by: INTERNAL MEDICINE

## 2024-01-11 PROCEDURE — 99207 PR STATISTIC IV PUSH SINGLE INITIAL SUBSTANCE: CPT | Performed by: INTERNAL MEDICINE

## 2024-01-11 RX ORDER — KETOROLAC TROMETHAMINE 30 MG/ML
1 INJECTION, SOLUTION INTRAMUSCULAR; INTRAVENOUS CONTINUOUS
Qty: 1 EACH | Refills: 0 | Status: SHIPPED | OUTPATIENT
Start: 2024-01-11 | End: 2024-02-12

## 2024-01-11 RX ADMIN — Medication 5 ML: at 12:40

## 2024-01-11 NOTE — TELEPHONE ENCOUNTER
[facility] Call Center    Phone Message    May a detailed message be left on voicemail: yes     Reason for Call: Other: Patient called stating he now needs a reader his phone is not compatible for his sensor Freestyle Susu #3.     Action Taken: Message routed to:  [facility] Endo    Travel Screening: Not Applicable

## 2024-01-12 ENCOUNTER — HOSPITAL ENCOUNTER (OUTPATIENT)
Dept: NUCLEAR MEDICINE | Facility: CLINIC | Age: 64
Setting detail: NUCLEAR MEDICINE
Discharge: HOME | End: 2024-01-12
Attending: INTERNAL MEDICINE
Payer: COMMERCIAL

## 2024-01-12 ENCOUNTER — HOSPITAL ENCOUNTER (OUTPATIENT)
Dept: CARDIOLOGY | Facility: CLINIC | Age: 64
Discharge: HOME | End: 2024-01-12
Attending: INTERNAL MEDICINE
Payer: COMMERCIAL

## 2024-01-12 ENCOUNTER — LAB (OUTPATIENT)
Dept: LAB | Facility: CLINIC | Age: 64
End: 2024-01-12
Attending: INTERNAL MEDICINE
Payer: COMMERCIAL

## 2024-01-12 DIAGNOSIS — I25.83 CORONARY ARTERY DISEASE DUE TO LIPID RICH PLAQUE: ICD-10-CM

## 2024-01-12 DIAGNOSIS — R06.02 SOB (SHORTNESS OF BREATH): ICD-10-CM

## 2024-01-12 DIAGNOSIS — I25.10 CORONARY ARTERY DISEASE DUE TO LIPID RICH PLAQUE: ICD-10-CM

## 2024-01-12 LAB
ALBUMIN SERPL BCG-MCNC: 4 G/DL (ref 3.5–5.2)
ALP SERPL-CCNC: 86 U/L (ref 40–150)
ALT SERPL W P-5'-P-CCNC: 26 U/L (ref 0–70)
AST SERPL W P-5'-P-CCNC: 18 U/L (ref 0–45)
BILIRUB DIRECT SERPL-MCNC: <0.2 MG/DL (ref 0–0.3)
BILIRUB SERPL-MCNC: 0.3 MG/DL
CHOLEST SERPL-MCNC: 164 MG/DL
CV STRESS MAX HR HE: 103
FASTING STATUS PATIENT QL REPORTED: YES
HDLC SERPL-MCNC: 29 MG/DL
LDLC SERPL CALC-MCNC: 76 MG/DL
NONHDLC SERPL-MCNC: 135 MG/DL
PROT SERPL-MCNC: 7.3 G/DL (ref 6.4–8.3)
RATE PRESSURE PRODUCT: NORMAL
STRESS ECHO BASELINE DIASTOLIC HE: 87
STRESS ECHO BASELINE HR: 71 BPM
STRESS ECHO BASELINE SYSTOLIC BP: 163
STRESS ECHO CALCULATED PERCENT HR: 65 %
STRESS ECHO LAST STRESS DIASTOLIC BP: 82
STRESS ECHO LAST STRESS SYSTOLIC BP: 161
STRESS ECHO TARGET HR: 159
TRIGL SERPL-MCNC: 297 MG/DL

## 2024-01-12 PROCEDURE — 82465 ASSAY BLD/SERUM CHOLESTEROL: CPT

## 2024-01-12 PROCEDURE — 93016 CV STRESS TEST SUPVJ ONLY: CPT | Performed by: INTERNAL MEDICINE

## 2024-01-12 PROCEDURE — 78452 HT MUSCLE IMAGE SPECT MULT: CPT | Mod: 26 | Performed by: RADIOLOGY

## 2024-01-12 PROCEDURE — 343N000001 HC RX 343: Performed by: INTERNAL MEDICINE

## 2024-01-12 PROCEDURE — 93017 CV STRESS TEST TRACING ONLY: CPT

## 2024-01-12 PROCEDURE — 36415 COLL VENOUS BLD VENIPUNCTURE: CPT

## 2024-01-12 PROCEDURE — 80076 HEPATIC FUNCTION PANEL: CPT

## 2024-01-12 PROCEDURE — A9502 TC99M TETROFOSMIN: HCPCS | Performed by: INTERNAL MEDICINE

## 2024-01-12 PROCEDURE — 250N000011 HC RX IP 250 OP 636: Performed by: INTERNAL MEDICINE

## 2024-01-12 PROCEDURE — 78452 HT MUSCLE IMAGE SPECT MULT: CPT

## 2024-01-12 PROCEDURE — 93018 CV STRESS TEST I&R ONLY: CPT | Performed by: INTERNAL MEDICINE

## 2024-01-12 RX ORDER — AMINOPHYLLINE 25 MG/ML
50-100 INJECTION, SOLUTION INTRAVENOUS
Status: DISCONTINUED | OUTPATIENT
Start: 2024-01-12 | End: 2024-01-13 | Stop reason: HOSPADM

## 2024-01-12 RX ORDER — ACYCLOVIR 200 MG/1
0-1 CAPSULE ORAL
Status: DISCONTINUED | OUTPATIENT
Start: 2024-01-12 | End: 2024-01-13 | Stop reason: HOSPADM

## 2024-01-12 RX ORDER — CAFFEINE CITRATE 20 MG/ML
60 SOLUTION INTRAVENOUS
Status: DISCONTINUED | OUTPATIENT
Start: 2024-01-12 | End: 2024-01-13 | Stop reason: HOSPADM

## 2024-01-12 RX ORDER — ALBUTEROL SULFATE 90 UG/1
2 AEROSOL, METERED RESPIRATORY (INHALATION) EVERY 5 MIN PRN
Status: DISCONTINUED | OUTPATIENT
Start: 2024-01-12 | End: 2024-01-13 | Stop reason: HOSPADM

## 2024-01-12 RX ORDER — REGADENOSON 0.08 MG/ML
0.4 INJECTION, SOLUTION INTRAVENOUS ONCE
Status: COMPLETED | OUTPATIENT
Start: 2024-01-12 | End: 2024-01-12

## 2024-01-12 RX ADMIN — TETROFOSMIN 11 MILLICURIE: 1.38 INJECTION, POWDER, LYOPHILIZED, FOR SOLUTION INTRAVENOUS at 09:22

## 2024-01-12 RX ADMIN — REGADENOSON 0.4 MG: 0.08 INJECTION, SOLUTION INTRAVENOUS at 10:21

## 2024-01-12 RX ADMIN — TETROFOSMIN 41 MILLICURIE: 1.38 INJECTION, POWDER, LYOPHILIZED, FOR SOLUTION INTRAVENOUS at 10:20

## 2024-01-12 NOTE — PROGRESS NOTES
Pt here for Lexiscan nuclear stress test. Medication and side effects reviewed with patient. Lung sounds clear to auscultation bilaterally. Denied caffeine use. Patient tolerated Lexiscan dose with normal pharmacological response. VSS. Monitored post injection and then taken to nuclear medicine for follow up imaging.    Maira Robins RN

## 2024-01-26 ENCOUNTER — TELEPHONE (OUTPATIENT)
Dept: FAMILY MEDICINE | Facility: CLINIC | Age: 64
End: 2024-01-26
Payer: COMMERCIAL

## 2024-01-26 DIAGNOSIS — Z79.4 TYPE 2 DIABETES MELLITUS WITH DIABETIC POLYNEUROPATHY, WITH LONG-TERM CURRENT USE OF INSULIN (H): ICD-10-CM

## 2024-01-26 DIAGNOSIS — E11.42 TYPE 2 DIABETES MELLITUS WITH DIABETIC POLYNEUROPATHY, WITH LONG-TERM CURRENT USE OF INSULIN (H): ICD-10-CM

## 2024-01-26 RX ORDER — GABAPENTIN 400 MG/1
CAPSULE ORAL
Status: CANCELLED | OUTPATIENT
Start: 2024-01-26

## 2024-01-26 RX ORDER — ISOSORBIDE MONONITRATE 30 MG/1
TABLET, EXTENDED RELEASE ORAL
Status: CANCELLED | OUTPATIENT
Start: 2024-01-26

## 2024-01-26 RX ORDER — BLOOD-GLUCOSE SENSOR
1 EACH MISCELLANEOUS
Qty: 2 EACH | Refills: 5 | OUTPATIENT
Start: 2024-01-26

## 2024-01-26 RX ORDER — DULOXETIN HYDROCHLORIDE 60 MG/1
CAPSULE, DELAYED RELEASE ORAL
Status: CANCELLED | OUTPATIENT
Start: 2024-01-26

## 2024-01-26 RX ORDER — CYANOCOBALAMIN 1000 UG/ML
INJECTION, SOLUTION INTRAMUSCULAR; SUBCUTANEOUS
Status: CANCELLED | OUTPATIENT
Start: 2024-01-26

## 2024-01-26 RX ORDER — GLIPIZIDE 5 MG/1
TABLET, FILM COATED, EXTENDED RELEASE ORAL
Status: CANCELLED | OUTPATIENT
Start: 2024-01-26

## 2024-01-26 RX ORDER — ALLOPURINOL 100 MG/1
TABLET ORAL
Status: CANCELLED | OUTPATIENT
Start: 2024-01-26

## 2024-01-26 RX ORDER — ATORVASTATIN CALCIUM 80 MG/1
TABLET, FILM COATED ORAL
Status: CANCELLED | OUTPATIENT
Start: 2024-01-26

## 2024-01-26 RX ORDER — ALBUTEROL SULFATE 90 UG/1
AEROSOL, METERED RESPIRATORY (INHALATION)
Qty: 18 G | Status: CANCELLED | OUTPATIENT
Start: 2024-01-26

## 2024-01-26 RX ORDER — FENOFIBRATE 145 MG/1
TABLET, COATED ORAL
Status: CANCELLED | OUTPATIENT
Start: 2024-01-26

## 2024-01-26 RX ORDER — KETOROLAC TROMETHAMINE 30 MG/ML
1 INJECTION, SOLUTION INTRAMUSCULAR; INTRAVENOUS CONTINUOUS
Qty: 1 EACH | Refills: 0 | OUTPATIENT
Start: 2024-01-26

## 2024-01-26 RX ORDER — PREGABALIN 150 MG/1
CAPSULE ORAL
Status: CANCELLED | OUTPATIENT
Start: 2024-01-26

## 2024-01-26 NOTE — COMMUNITY RESOURCES LIST (ENGLISH)
01/26/2024   [facility] [employer]  N/A  For questions about this resource list or additional care needs, please contact your primary care clinic or care manager.  Phone: [phone]   Email: N/A   Address: [address] [place] [id number]   Hours: N/A        Financial Stability       Utility payment assistance  1  [facility] Distance: 3.32 miles      In-Person   [address] [place] [address]  Language: English, Kinyarwanda  Hours: Mon - Fri 8:00 AM - 3:30 PM  Fees: Free, Self Pay   Phone: (741) 917-5914 Email: [email or web address] Website: [email or web address]     2  [facility] Distance: 6.28 miles      Phone/Virtual   [address] [place] [address]  Language: English  Hours: Mon - Fri 8:30 AM - 12:00 PM , Mon - Fri 1:00 PM - 4:00 PM  Fees: Free   Phone: (177) 741-5510 Email: [email or web address] Website: [email or web address]          Important Numbers & Websites       Emergency Services   911  [facility] Services   311  Poison Control   (251) 462-5633  Suicide Prevention Lifeline   (311) 183-2984 (TALK)  Child Abuse Hotline   (336) 268-7036 (4-A-Child)  Sexual Assault Hotline   (808) 903-2276 (HOPE)  National Runaway Safeline   (880) 674-7443 (RUNAWAY)  All-Options Talkline   (996) 319-2288  Substance Abuse Referral   (807) 603-2317 (HELP)

## 2024-01-26 NOTE — TELEPHONE ENCOUNTER
Pt states he is new to our clinic. Pt has hx of neuropathy that is increasing. He has been using biofreeze one bottle every two days and lidocaine spray.  Both did help before but are becoming less effective. He states the pain is making him cry at night. Advised appointment for evaluation and plan.  Appointment made for Monday.  Mellisa RUBION, RN

## 2024-01-26 NOTE — TELEPHONE ENCOUNTER
Patient calling for refills, meds and pharmacy are pending. Patient has been out of meds for 2 weeks          Bryon Hernandez

## 2024-01-29 ENCOUNTER — OFFICE VISIT (OUTPATIENT)
Dept: FAMILY MEDICINE | Facility: CLINIC | Age: 64
End: 2024-01-29
Payer: COMMERCIAL

## 2024-01-29 ENCOUNTER — TELEPHONE (OUTPATIENT)
Dept: FAMILY MEDICINE | Facility: CLINIC | Age: 64
End: 2024-01-29

## 2024-01-29 VITALS
SYSTOLIC BLOOD PRESSURE: 156 MMHG | RESPIRATION RATE: 16 BRPM | OXYGEN SATURATION: 99 % | HEIGHT: 68 IN | HEART RATE: 79 BPM | DIASTOLIC BLOOD PRESSURE: 86 MMHG | WEIGHT: 217 LBS | TEMPERATURE: 97.2 F | BODY MASS INDEX: 32.89 KG/M2

## 2024-01-29 DIAGNOSIS — Z79.4 TYPE 2 DIABETES MELLITUS WITH DIABETIC POLYNEUROPATHY, WITH LONG-TERM CURRENT USE OF INSULIN (H): ICD-10-CM

## 2024-01-29 DIAGNOSIS — I25.83 CORONARY ARTERY DISEASE DUE TO LIPID RICH PLAQUE: ICD-10-CM

## 2024-01-29 DIAGNOSIS — K21.9 GASTROESOPHAGEAL REFLUX DISEASE WITHOUT ESOPHAGITIS: ICD-10-CM

## 2024-01-29 DIAGNOSIS — M1A.9XX0 CHRONIC GOUT WITHOUT TOPHUS, UNSPECIFIED CAUSE, UNSPECIFIED SITE: ICD-10-CM

## 2024-01-29 DIAGNOSIS — E11.42 TYPE 2 DIABETES MELLITUS WITH DIABETIC POLYNEUROPATHY, WITH LONG-TERM CURRENT USE OF INSULIN (H): ICD-10-CM

## 2024-01-29 DIAGNOSIS — E78.5 HYPERLIPIDEMIA LDL GOAL <100: ICD-10-CM

## 2024-01-29 DIAGNOSIS — I25.10 CORONARY ARTERY DISEASE DUE TO LIPID RICH PLAQUE: ICD-10-CM

## 2024-01-29 DIAGNOSIS — J44.9 CHRONIC OBSTRUCTIVE PULMONARY DISEASE, UNSPECIFIED COPD TYPE (H): ICD-10-CM

## 2024-01-29 DIAGNOSIS — G62.9 PERIPHERAL POLYNEUROPATHY: Primary | ICD-10-CM

## 2024-01-29 LAB — HBA1C MFR BLD: 9.3 % (ref 0–5.6)

## 2024-01-29 PROCEDURE — 36415 COLL VENOUS BLD VENIPUNCTURE: CPT | Performed by: FAMILY MEDICINE

## 2024-01-29 PROCEDURE — 80048 BASIC METABOLIC PNL TOTAL CA: CPT | Performed by: FAMILY MEDICINE

## 2024-01-29 PROCEDURE — 82570 ASSAY OF URINE CREATININE: CPT | Performed by: FAMILY MEDICINE

## 2024-01-29 PROCEDURE — 99214 OFFICE O/P EST MOD 30 MIN: CPT | Performed by: FAMILY MEDICINE

## 2024-01-29 PROCEDURE — 83036 HEMOGLOBIN GLYCOSYLATED A1C: CPT | Performed by: FAMILY MEDICINE

## 2024-01-29 PROCEDURE — 82043 UR ALBUMIN QUANTITATIVE: CPT | Performed by: FAMILY MEDICINE

## 2024-01-29 RX ORDER — FENOFIBRATE 145 MG/1
145 TABLET, COATED ORAL DAILY
Qty: 90 TABLET | Refills: 1 | Status: SHIPPED | OUTPATIENT
Start: 2024-01-29

## 2024-01-29 RX ORDER — INSULIN GLARGINE 100 [IU]/ML
50 INJECTION, SOLUTION SUBCUTANEOUS 2 TIMES DAILY
Qty: 15 ML | Refills: 1 | Status: SHIPPED | OUTPATIENT
Start: 2024-01-29 | End: 2024-05-28

## 2024-01-29 RX ORDER — PREGABALIN 150 MG/1
CAPSULE ORAL
Status: CANCELLED | OUTPATIENT
Start: 2024-01-29

## 2024-01-29 RX ORDER — DULAGLUTIDE 3 MG/.5ML
INJECTION, SOLUTION SUBCUTANEOUS
Status: CANCELLED | OUTPATIENT
Start: 2024-01-29

## 2024-01-29 RX ORDER — GABAPENTIN 400 MG/1
CAPSULE ORAL
Status: CANCELLED | OUTPATIENT
Start: 2024-01-29

## 2024-01-29 RX ORDER — INSULIN ASPART 100 [IU]/ML
INJECTION, SOLUTION INTRAVENOUS; SUBCUTANEOUS
Qty: 15 ML | Refills: 1 | Status: SHIPPED | OUTPATIENT
Start: 2024-01-29 | End: 2024-03-26

## 2024-01-29 RX ORDER — ISOSORBIDE MONONITRATE 30 MG/1
30 TABLET, EXTENDED RELEASE ORAL DAILY
Qty: 90 TABLET | Refills: 1 | Status: SHIPPED | OUTPATIENT
Start: 2024-01-29

## 2024-01-29 RX ORDER — PEN NEEDLE, DIABETIC 31 GX3/16"
NEEDLE, DISPOSABLE MISCELLANEOUS
Qty: 100 EACH | Refills: 3 | Status: SHIPPED | OUTPATIENT
Start: 2024-01-29 | End: 2024-09-05

## 2024-01-29 RX ORDER — ALBUTEROL SULFATE 90 UG/1
1-2 AEROSOL, METERED RESPIRATORY (INHALATION) EVERY 6 HOURS PRN
Qty: 18 G | Refills: 1 | Status: SHIPPED | OUTPATIENT
Start: 2024-01-29

## 2024-01-29 RX ORDER — KETOROLAC TROMETHAMINE 30 MG/ML
1 INJECTION, SOLUTION INTRAMUSCULAR; INTRAVENOUS CONTINUOUS
Qty: 1 EACH | Refills: 0 | Status: CANCELLED | OUTPATIENT
Start: 2024-01-29

## 2024-01-29 RX ORDER — ATORVASTATIN CALCIUM 80 MG/1
80 TABLET, FILM COATED ORAL DAILY
Qty: 90 TABLET | Refills: 1 | Status: SHIPPED | OUTPATIENT
Start: 2024-01-29 | End: 2024-09-05

## 2024-01-29 RX ORDER — CAPSAICIN 0.025 %
CREAM (GRAM) TOPICAL
Qty: 120 ML | Refills: 1 | Status: SHIPPED | OUTPATIENT
Start: 2024-01-29 | End: 2024-10-07 | Stop reason: SINTOL

## 2024-01-29 RX ORDER — BLOOD-GLUCOSE SENSOR
1 EACH MISCELLANEOUS
Qty: 2 EACH | Refills: 5 | Status: CANCELLED | OUTPATIENT
Start: 2024-01-29

## 2024-01-29 RX ORDER — ALLOPURINOL 100 MG/1
100 TABLET ORAL DAILY
Qty: 90 TABLET | Refills: 1 | Status: SHIPPED | OUTPATIENT
Start: 2024-01-29

## 2024-01-29 RX ORDER — DULOXETIN HYDROCHLORIDE 60 MG/1
60 CAPSULE, DELAYED RELEASE ORAL DAILY
Qty: 90 CAPSULE | Refills: 0 | Status: SHIPPED | OUTPATIENT
Start: 2024-01-29

## 2024-01-29 RX ORDER — GLIPIZIDE 5 MG/1
TABLET, FILM COATED, EXTENDED RELEASE ORAL
Status: CANCELLED | OUTPATIENT
Start: 2024-01-29

## 2024-01-29 ASSESSMENT — PAIN SCALES - GENERAL: PAINLEVEL: NO PAIN (0)

## 2024-01-29 NOTE — PROGRESS NOTES
Assessment & Plan   I am seeing the patient for the first time.  Patient recently moved from [place] to [place] to live with his son  Bryant Buck is a 63 year old male with past medical history of longstanding uncontrolled diabetes complicated with peripheral neuropathy, hypertension, coronary artery disease, chronic gout, hyperlipidemia, GERD who presents today for follow-up on chronic medical problems as below    Peripheral polyneuropathy  Patient reports longstanding history of bilateral hand and feet burning pain started several years ago.  He was diagnosed previously with diabetic polyneuropathy.  Pain is worse at night.  Patient does not know exactly the medication he is on but records revealed that he is currently on gabapentin Cymbalta.  Exam showed palpable dorsalis pedis and both feet.  He has some evidence of neuropathy on exam.  Recommended referral to neurology for further evaluation and management.  Will prescribed Zostrix cream 3 times daily as needed.  Discussed with the patient the importance of diabetes control to decrease progression of neuropathy.  He was seen previously by the diabetic educator, advised to follow-up with diabetic educator.  Will also refer him to endocrinology  - DULoxetine (CYMBALTA) 60 MG capsule; Take 1 capsule (60 mg) by mouth daily  - Adult Neurology  Referral; Future  - capsaicin (ZOSTRIX) 0.025 % external cream; Apply topically to hands and feet 3 times daily    Type 2 diabetes mellitus with diabetic polyneuropathy, with long-term current use of insulin (H)  History of uncontrolled diabetes per patient.  No recent labs  Will obtain updated A1c, BMP and microalbumin  Continue the same medications for now.  Follow-up with PCP and the diabetic educator  - LANTUS SOLOSTAR 100 UNIT/ML soln; Inject 50 Units Subcutaneous 2 times daily  - metFORMIN (GLUCOPHAGE) 1000 MG tablet; Take 1 tablet (1,000 mg) by mouth 2 times daily (with meals)  - NOVOLOG FLEXPEN 100  UNIT/ML soln; INJECT 0-50 UNITS SUBQ THREE TIMES DAILY AS NEEDED PER SLIDING SCALE  - SURE COMFORT PEN NEEDLES 31G X 8 MM miscellaneous; USE TO INJECT INSULIN, SEVEN PER DAY  - HEMOGLOBIN A1C; Future  - BASIC METABOLIC PANEL; Future  - Albumin Random Urine Quantitative with Creat Ratio; Future  - Adult Endocrinology  Referral; Future  - HEMOGLOBIN A1C  - BASIC METABOLIC PANEL  - Albumin Random Urine Quantitative with Creat Ratio  Lab Results   Component Value Date    A1C 9.3 01/29/2024       Chronic gout without tophus, unspecified cause, unspecified site  Chronic problem.  No recent flareup.  Continue allopurinol 100 mg.  - allopurinol (ZYLOPRIM) 100 MG tablet; Take 1 tablet (100 mg) by mouth daily    Hyperlipidemia LDL goal <100  Chronic problem.  Continue Lipitor and fenofibrate.  Patient requesting refill  - atorvastatin (LIPITOR) 80 MG tablet; Take 1 tablet (80 mg) by mouth daily  - fenofibrate (TRICOR) 145 MG tablet; Take 1 tablet (145 mg) by mouth daily    Coronary artery disease due to lipid rich plaque  Denies any current symptoms of chest pain or shortness of breath.  He was seen by cardiology recently on 01/04/2024 to establish care EKG done at that time showed normal sinus rhythm, with plan for echocardiogram, Lexiscan to evaluate for prior history of MI and any ischemia as well as evaluation of dyspnea on exertion.  He was advised to follow-up yearly with cardiology.  - isosorbide mononitrate (IMDUR) 30 MG 24 hr tablet; Take 1 tablet (30 mg) by mouth daily    Gastroesophageal reflux disease without esophagitis  Taking omeprazole 20 mg, requested refill  - omeprazole (PRILOSEC) 20 MG DR capsule; Take 1 capsule (20 mg) by mouth daily    Chronic obstructive pulmonary disease, unspecified COPD type (H)  No current symptoms.  Continue albuterol  - VENTOLIN  (90 Base) MCG/ACT inhaler; Inhale 1-2 puffs into the lungs every 6 hours as needed for shortness of breath, wheezing or  "cough            BMI  Estimated body mass index is 32.99 kg/m  as calculated from the following:    Height as of this encounter: 1.727 m (5' 8\").    Weight as of this encounter: 98.4 kg (217 lb).           Tania Hurtado is a 63 year old, presenting for the following health issues:  NEUROPATHY        1/29/2024    11:01 AM   Additional Questions   Roomed by Trina BARNETT     History of Present Illness       Reason for visit:  Pain    He eats 4 or more servings of fruits and vegetables daily.He consumes 0 sweetened beverage(s) daily.He exercises with enough effort to increase his heart rate 20 to 29 minutes per day.  He exercises with enough effort to increase his heart rate 7 days per week.   He is taking medications regularly.                 Review of Systems        Objective    BP (!) 156/86   Pulse 79   Temp 97.2  F (36.2  C) (Tympanic)   Resp 16   Ht 1.727 m (5' 8\")   Wt 98.4 kg (217 lb)   SpO2 99%   BMI 32.99 kg/m    Body mass index is 32.99 kg/m .  Physical Exam  Vitals and nursing note reviewed.   Constitutional:       General: He is not in acute distress.     Appearance: Normal appearance. He is not ill-appearing, toxic-appearing or diaphoretic.   HENT:      Head: Normocephalic and atraumatic.   Cardiovascular:      Pulses:           Dorsalis pedis pulses are 2+ on the right side and 2+ on the left side.        Posterior tibial pulses are 2+ on the right side and 2+ on the left side.   Musculoskeletal:      Right foot: Normal range of motion. No deformity, bunion, Charcot foot or foot drop.      Left foot: Normal range of motion. No deformity, bunion, Charcot foot, foot drop or prominent metatarsal heads.   Feet:      Right foot:      Protective Sensation: 8 sites tested.  4 sites sensed.      Skin integrity: No ulcer, blister, skin breakdown, erythema, warmth, callus, dry skin or fissure.      Toenail Condition: Right toenails are abnormally thick.      Left foot:      Protective Sensation: 8 " sites tested.  4 sites sensed.      Skin integrity: No ulcer, blister, skin breakdown, erythema, warmth, callus, dry skin or fissure.      Toenail Condition: Left toenails are abnormally thick.   Neurological:      Mental Status: He is alert.                    Signed Electronically by: Layne Martienz MD

## 2024-01-29 NOTE — PATIENT INSTRUCTIONS
Follow up with Neurology for your nerve pain     Labs today to check diabetes    Follow up with your primary care provider      Medications refilled today     Follow up with endocrinology

## 2024-01-29 NOTE — TELEPHONE ENCOUNTER
"Keren from pharmacy called and was asking for clarification on prescriptions for Ventolin and Sure Comfort Pen [place].  States that on both scripts it was written \"No substitutions allowed\".  The Ventolin then goes down further in the script with the wording about the pharmacy may dispense covered by insurance.      Routing to provider to review and advise.  Please clarify if substitutions are allowed for these two medications.        Kristina Kjellberg, MSN, RN  [facility] Primary Care Triage    "

## 2024-01-30 LAB
ANION GAP SERPL CALCULATED.3IONS-SCNC: 10 MMOL/L (ref 7–15)
BUN SERPL-MCNC: 15.7 MG/DL (ref 8–23)
CALCIUM SERPL-MCNC: 8.4 MG/DL (ref 8.8–10.2)
CHLORIDE SERPL-SCNC: 103 MMOL/L (ref 98–107)
CREAT SERPL-MCNC: 0.68 MG/DL (ref 0.67–1.17)
CREAT UR-MCNC: 84.7 MG/DL
DEPRECATED HCO3 PLAS-SCNC: 27 MMOL/L (ref 22–29)
EGFRCR SERPLBLD CKD-EPI 2021: >90 ML/MIN/1.73M2
GLUCOSE SERPL-MCNC: 109 MG/DL (ref 70–99)
MICROALBUMIN UR-MCNC: 58.1 MG/L
MICROALBUMIN/CREAT UR: 68.6 MG/G CR (ref 0–17)
POTASSIUM SERPL-SCNC: 4.4 MMOL/L (ref 3.4–5.3)
SODIUM SERPL-SCNC: 140 MMOL/L (ref 135–145)

## 2024-02-02 NOTE — TELEPHONE ENCOUNTER
[facility] is following up on this, as they have not received a response yet.Please advise .Gill Hutchinson [facility]

## 2024-02-05 NOTE — CONFIDENTIAL NOTE
RECORDS RECEIVED FROM: internal    DATE RECEIVED: 2.23.24    NOTES (FOR ALL VISITS) STATUS DETAILS   OFFICE NOTES from referring provider internal  Layne Martinez MD,    MEDICATION LIST internal     IMAGING      NUCLEAR  Internal  [facility] rad 1.12.24    LABS     DIABETES: HBGA1C, CREATININE, FASTING LIPIDS, MICROALBUMIN URINE, POTASSIUM, TSH, T4    THYROID: TSH, T4, CBC, THYRODLONULIN, TOTAL T3, FREE T4, CALCITONIN, CEA internal  BMP- 1.29.24  HBGA1C- 1.29.24  Lipid- 1.12.24  Received labs- 11.27.23

## 2024-02-10 DIAGNOSIS — E11.42 TYPE 2 DIABETES MELLITUS WITH DIABETIC POLYNEUROPATHY, WITH LONG-TERM CURRENT USE OF INSULIN (H): ICD-10-CM

## 2024-02-10 DIAGNOSIS — Z79.4 TYPE 2 DIABETES MELLITUS WITH DIABETIC POLYNEUROPATHY, WITH LONG-TERM CURRENT USE OF INSULIN (H): ICD-10-CM

## 2024-02-12 RX ORDER — KETOROLAC TROMETHAMINE 30 MG/ML
INJECTION, SOLUTION INTRAMUSCULAR; INTRAVENOUS SEE ADMIN INSTRUCTIONS
Qty: 1 EACH | Refills: 1 | Status: SHIPPED | OUTPATIENT
Start: 2024-02-12 | End: 2024-04-08

## 2024-02-13 NOTE — PROGRESS NOTES
Outcome for 02/13/24 9:32 AM: [employer] message sent  Betsy Ruiz MA  Outcome for 02/21/24 10:08 AM: Left Voicemail   Jennifer Dwyer LPN   Outcome for 02/22/24 2:18 PM: Data obtained via [employer] website- Data from 1/29/24-2/11/24. Pt ran out of sensors and has been checking manually. BG readings below.  Betsy Ruiz MA    Patient is showing 3/5 MNCM met. BP out range and A1c not in range   Jennifer Dwyer LPN    2/11/24  7:09am: 263  10:00am: 256  1:00pm: 260  4:00pm: 190  8:00pm: 2 HIGH  10:00pm: 211    2/12/24  7:00am: 260  10:00am: 256  1:00pm: 187  4:00pm: 176  8:00pm: 180  10:00pm: 150    2/13/24  7:00am: 179  10:00am: 168  1:00pm: 201  4:00pm: 200  8:00pm: 221  10:00pm: 198    2/14/24  7:00am: 173  10:00am: 185  1:00pm: 167  4:00pm: 230  8:00pm: 221  10:00pm: 247    2/15/24  7:00am: 188  10:00am: 174  1:00pm: 198  4:00pm: 209  8:00pm: 225  10:00pm: 164    2/16/24  7:00am: 140  10:00am: 173  1:00pm: 116  4:00pm: 187  8:00pm: 236  10:00pm: 197    2/17/24  7:00am: 300  10:00am: 208  1:00pm: 318  4:00pm: 274  8:00pm: 212  10:00pm: 169    2/18/24  7:00am: 304  10:00am: 293  1:00pm: 340  4:00pm: 297  8:00pm: 325  10:00pm: 291    2/19/24  7:00am: 266  10:00am: 234  1:00pm: 205  4:00pm: 235  8:00pm: 276  10:00pm: 290    2/20/24  7:00am: 129  10:00am: 114  1:00pm: 161  4:00pm: 198  8:00pm: 155  10:00pm: 144    2/21/24  7:00am: 238  10:00am: 237  1:00pm: 214  4:00pm: 222  8:00pm: 181  10:00pm: 111    2/22/24  7:00am: 207  10:00am: 219  1:00pm: 191

## 2024-02-21 ENCOUNTER — TELEPHONE (OUTPATIENT)
Dept: ENDOCRINOLOGY | Facility: CLINIC | Age: 64
End: 2024-02-21
Payer: COMMERCIAL

## 2024-02-21 NOTE — TELEPHONE ENCOUNTER
Called patient and left voicemail. Patient has appointment on 2/23/24. Need to collect 14 days of blood sugar readings if patient is using meter, or if patient is using CGM, need to get patients device uploaded to retrieve report for provider to review.    Jennifer Dwyer LPN 02/21/24 10:08 AM

## 2024-02-22 NOTE — TELEPHONE ENCOUNTER
Spoke with patient. Pt is using [employer] but ran out of sensors. Assisted patient with upload and linking to clinic. Data obtained. Blood sugar readings from manual readings below. Betsy Ruiz CMA on 2/22/2024 at 2:22 PM    2/11/24  7:09am: 263  10:00am: 256  1:00pm: 260  4:00pm: 190  8:00pm: 2 HIGH  10:00pm: 211    2/12/24  7:00am: 260  10:00am: 256  1:00pm: 187  4:00pm: 176  8:00pm: 180  10:00pm: 150    2/13/24  7:00am: 179  10:00am: 168  1:00pm: 201  4:00pm: 200  8:00pm: 221  10:00pm: 198    2/14/24  7:00am: 173  10:00am: 185  1:00pm: 167  4:00pm: 230  8:00pm: 221  10:00pm: 247    2/15/24  7:00am: 188  10:00am: 174  1:00pm: 198  4:00pm: 209  8:00pm: 225  10:00pm: 164    2/16/24  7:00am: 140  10:00am: 173  1:00pm: 116  4:00pm: 187  8:00pm: 236  10:00pm: 197    2/17/24  7:00am: 300  10:00am: 208  1:00pm: 318  4:00pm: 274  8:00pm: 212  10:00pm: 169    2/18/24  7:00am: 304  10:00am: 293  1:00pm: 340  4:00pm: 297  8:00pm: 325  10:00pm: 291    2/19/24  7:00am: 266  10:00am: 234  1:00pm: 205  4:00pm: 235  8:00pm: 276  10:00pm: 290    2/20/24  7:00am: 129  10:00am: 114  1:00pm: 161  4:00pm: 198  8:00pm: 155  10:00pm: 144    2/21/24  7:00am: 238  10:00am: 237  1:00pm: 214  4:00pm: 222  8:00pm: 181  10:00pm: 111    2/22/24  7:00am: 207  10:00am: 219  1:00pm: 191

## 2024-02-23 ENCOUNTER — TELEPHONE (OUTPATIENT)
Dept: ENDOCRINOLOGY | Facility: CLINIC | Age: 64
End: 2024-02-23

## 2024-02-23 ENCOUNTER — VIRTUAL VISIT (OUTPATIENT)
Dept: ENDOCRINOLOGY | Facility: CLINIC | Age: 64
End: 2024-02-23
Attending: FAMILY MEDICINE
Payer: COMMERCIAL

## 2024-02-23 ENCOUNTER — PRE VISIT (OUTPATIENT)
Dept: ENDOCRINOLOGY | Facility: CLINIC | Age: 64
End: 2024-02-23

## 2024-02-23 DIAGNOSIS — Z79.4 TYPE 2 DIABETES MELLITUS WITH DIABETIC POLYNEUROPATHY, WITH LONG-TERM CURRENT USE OF INSULIN (H): ICD-10-CM

## 2024-02-23 DIAGNOSIS — E11.42 TYPE 2 DIABETES MELLITUS WITH DIABETIC POLYNEUROPATHY, WITH LONG-TERM CURRENT USE OF INSULIN (H): ICD-10-CM

## 2024-02-23 PROCEDURE — 99204 OFFICE O/P NEW MOD 45 MIN: CPT | Mod: 95 | Performed by: INTERNAL MEDICINE

## 2024-02-23 RX ORDER — DULAGLUTIDE 3 MG/.5ML
3 INJECTION, SOLUTION SUBCUTANEOUS WEEKLY
Qty: 6 ML | Refills: 2 | OUTPATIENT
Start: 2024-02-23 | End: 2024-02-26

## 2024-02-23 RX ORDER — BLOOD-GLUCOSE SENSOR
1 EACH MISCELLANEOUS
Qty: 6 EACH | Refills: 5 | Status: SHIPPED | OUTPATIENT
Start: 2024-02-23 | End: 2024-09-05

## 2024-02-23 NOTE — PROGRESS NOTES
Endocrinology Note         Bryant is a 63 year old male presents today for uncontrolled type 2 diabetes    HPI  Bryant is a 63 year old male with past medical history of longstanding uncontrolled diabetes complicated with peripheral neuropathy and nephropathy, hypertension, coronary artery disease, stroke, chronic gout, hyperlipidemia, GERD presents today for uncontrolled type 2 diabetes    Patient recently moved from [place] to [place] to live with his son around the end of 2023.    A1c 9.3% in January 2024. He reports that it came down from 11.4% in October 2023.    Current diabetes regimen  Lantus 50 units AM and 50 units PM   Metformin 1000 mg twice a day with meal  Glipizide XR 15 mg daily  Novolog sliding scale 4 times a day     Was on Trulicity but has been waiting for PA process.        Diet habit: eating at home, eat a lot of vegetable, salad, fruit  BF: toast, egg  Lunch: salad  Dinner: salad  Beverages: water, coffee, unsweetened tea    DM complications:  Retinopathy: early macular degeneration, last exam over a year in [place]   Nephropathy: positive urine microalbumin  Neuropathy: +constant pain, , difficulty to walk, fall often    Past Medical History  Past Medical History:   Diagnosis Date    COPD exacerbation (H)     Coronary artery disease     Diabetes (H)     History of stroke     2-3 strokes    Hyperlipidemia     Hypertension     Myocardial infarction (H)     Neuropathy     Sleep apnea        Allergies  No Known Allergies    Medications  Current Outpatient Medications   Medication Sig Dispense Refill    allopurinol (ZYLOPRIM) 100 MG tablet Take 1 tablet (100 mg) by mouth daily 90 tablet 1    aspirin 81 MG EC tablet Take 81 mg by mouth daily      atorvastatin (LIPITOR) 80 MG tablet Take 1 tablet (80 mg) by mouth daily 90 tablet 1    B-D U/F insulin pen needle       capsaicin (ZOSTRIX) 0.025 % external cream Apply topically to hands and feet 3 times daily 120 mL 1    Continuous Blood Gluc   (Culture MachineSTShoozy AIDA 3 READER) ABUNDIO Use as directed 1 each 1    Continuous Blood Gluc Sensor (FREESTYLE AIDA 3 SENSOR) MISC 1 each every 14 days Use 1 sensor every 14 days. 2 each 5    CONTOUR NEXT TEST test strip       cyanocobalamin (CYANOCOBALAMIN) 1000 MCG/ML injection       DULoxetine (CYMBALTA) 60 MG capsule Take 1 capsule (60 mg) by mouth daily 90 capsule 0    fenofibrate (TRICOR) 145 MG tablet Take 1 tablet (145 mg) by mouth daily 90 tablet 1    gabapentin (NEURONTIN) 400 MG capsule       glipiZIDE (GLUCOTROL XL) 5 MG 24 hr tablet       isosorbide mononitrate (IMDUR) 30 MG 24 hr tablet Take 1 tablet (30 mg) by mouth daily 90 tablet 1    LANTUS SOLOSTAR 100 UNIT/ML soln Inject 50 Units Subcutaneous 2 times daily 15 mL 1    loratadine (CLARITIN) 10 MG tablet       metFORMIN (GLUCOPHAGE) 1000 MG tablet Take 1 tablet (1,000 mg) by mouth 2 times daily (with meals) 180 tablet 1    NOVOLOG FLEXPEN 100 UNIT/ML soln INJECT 0-50 UNITS SUBQ THREE TIMES DAILY AS NEEDED PER SLIDING SCALE 15 mL 1    omeprazole (PRILOSEC) 20 MG DR capsule Take 1 capsule (20 mg) by mouth daily 90 capsule 0    pregabalin (LYRICA) 150 MG capsule       SURE COMFORT PEN NEEDLES 31G X 8 MM miscellaneous USE TO INJECT INSULIN, SEVEN PER  each 3    TRULICITY 3 MG/0.5ML SOPN INJECT 3MG SUBCUTANEOUSLY EVERY WEEK      VENTOLIN  (90 Base) MCG/ACT inhaler Inhale 1-2 puffs into the lungs every 6 hours as needed for shortness of breath, wheezing or cough 18 g 1     Family History  family history is not on file.  Social History  Social History     Tobacco Use    Smoking status: Former     Types: Cigarettes     Quit date:      Years since quittin.1    Smokeless tobacco: Never   Vaping Use    Vaping Use: Never used   Currently on disability due to neuropathy    ROS  10 points ROS were negative otherwise mentioned in HPI      Physical Exam  Limited due to virtual visit  Constitutional: no distress, comfortable, pleasant   Eyes:  anicteric  Skin:no jaundice   Neurological: cranial nerves intact  Psychological: appropriate mood       RESULTS  I have personally reviewed labs and images. I also reviewed labs with patient and discussed the result and plan of care.    Lab Results   Component Value Date    A1C 9.3 01/29/2024      Latest Ref Rng 1/12/2024  8:40 AM 1/29/2024  11:46 AM   ENDO DIABETES      ALT 0 - 70 U/L 26     AST 0 - 45 U/L 18     Cholesterol <200 mg/dL 164     LDL Cholesterol Calculated <=100 mg/dL 76     HDL Cholesterol >=40 mg/dL 29 (L)     Non HDL Cholesterol <130 mg/dL 135 (H)     Triglycerides <150 mg/dL 297 (H)     TRIG (EXT) <150 mg/dL 297 (H)     Creatinine 0.67 - 1.17 mg/dL  0.68    Albumin Urine mg/L mg/L  58.1    Albumin Urine mg/g Cr 0.00 - 17.00 mg/g Cr  68.60 (H)       Legend:  (L) Low  (H) High    ASSESSMENT:    Bryant is a 63 year old male with past medical history of longstanding uncontrolled diabetes complicated with peripheral neuropathy and nephropathy, hypertension, coronary artery disease, chronic gout, hyperlipidemia, GERD presents today for uncontrolled type 2 diabetes    1) uncontrolled type 2 diabetes: recent A1c 9.3% (came down from 11% in October). TIR is not at goal. His diabetes is complicated by peripheral neuropathy and nephropathy. He has CAD. I think he would benefit from adding SGLT2 inhibitor. Discussed benefit of SGLT2 inhibitor for reduction cardiorenal risk.     2) DM complications:  Retinopathy: early macular degeneration, last exam over a year in [place] -- refer eye exam  Nephropathy: positive urine microalbumin  Neuropathy: +constant pain, , difficulty to walk, fall often    PLAN:   - start Jardiance 10 mg daily  - Lantus 50 units AM and 50 units PM   - Metformin 1000 mg twice a day with meal  - Glipizide XR 15 mg daily  - Novolog sliding scale 4 times a day   - resend Susu 3 to pharmacy  - refer ophthalmology for eye exam    See ASHLEY De Anda pharmD in 4-6 weeks  See PA in 3  months  See MD in 6 months    Joined the call at 2/23/2024, 3:24:01 pm.  Left the call at 2/23/2024, 3:39:31 pm.  You were on the call for 15 minutes 29     External notes/medical records independently reviewed, labs and imaging independently reviewed, medical management and tests to be discussed/communicated to patient.    Time: I spent 45 minutes spent on the date of the encounter preparing to see patient (including chart review and preparation), obtaining and or reviewing additional medical history, performing a physical exam and evaluation, documenting clinical information in the electronic health record, independently interpreting results, communicating results to the patient and coordinating care.    Davon Hinds MD  Division of Diabetes and Endocrinology  Department of Medicine

## 2024-02-23 NOTE — TELEPHONE ENCOUNTER
PA Initiation Key: [id number]    Medication: TRULICITY 3 MG/0.5ML SC SOPN  Insurance Company: [employer] - Phone [phone] Fax [phone]  Pharmacy Filling the Rx: [id number] - [place], [place] - [address]   Filling Pharmacy Phone: [phone]  Filling Pharmacy Fax: [phone]  Start Date: 2/23/2024

## 2024-02-23 NOTE — LETTER
2/23/2024       RE: Byrant Buck  [address] [address] [address]     Dear Colleague,    Thank you for referring your patient, Bryant Buck, to the [facility] [place] at [facility]. Please see a copy of my visit note below.    Outcome for 02/13/24 9:32 AM: [employer] message sent  Betsy Ruiz MA  Outcome for 02/21/24 10:08 AM: Left Voicemail   Jennifer Dwyer LPN   Outcome for 02/22/24 2:18 PM: Data obtained via [employer] website- Data from 1/29/24-2/11/24. Pt ran out of sensors and has been checking manually. BG readings below.  Betsy Ruiz MA    Patient is showing 3/5 MNCM met. BP out range and A1c not in range   Jennifer Dwyer LPN    2/11/24  7:09am: 263  10:00am: 256  1:00pm: 260  4:00pm: 190  8:00pm: 2 HIGH  10:00pm: 211    2/12/24  7:00am: 260  10:00am: 256  1:00pm: 187  4:00pm: 176  8:00pm: 180  10:00pm: 150    2/13/24  7:00am: 179  10:00am: 168  1:00pm: 201  4:00pm: 200  8:00pm: 221  10:00pm: 198    2/14/24  7:00am: 173  10:00am: 185  1:00pm: 167  4:00pm: 230  8:00pm: 221  10:00pm: 247    2/15/24  7:00am: 188  10:00am: 174  1:00pm: 198  4:00pm: 209  8:00pm: 225  10:00pm: 164    2/16/24  7:00am: 140  10:00am: 173  1:00pm: 116  4:00pm: 187  8:00pm: 236  10:00pm: 197    2/17/24  7:00am: 300  10:00am: 208  1:00pm: 318  4:00pm: 274  8:00pm: 212  10:00pm: 169    2/18/24  7:00am: 304  10:00am: 293  1:00pm: 340  4:00pm: 297  8:00pm: 325  10:00pm: 291    2/19/24  7:00am: 266  10:00am: 234  1:00pm: 205  4:00pm: 235  8:00pm: 276  10:00pm: 290    2/20/24  7:00am: 129  10:00am: 114  1:00pm: 161  4:00pm: 198  8:00pm: 155  10:00pm: 144    2/21/24  7:00am: 238  10:00am: 237  1:00pm: 214  4:00pm: 222  8:00pm: 181  10:00pm: 111    2/22/24  7:00am: 207  10:00am: 219  1:00pm: 191                   Endocrinology Note         Bryant is a 63 year old male presents today for uncontrolled type 2 diabetes    HPI  Bryant is a 63 year old  male with past medical history of longstanding uncontrolled diabetes complicated with peripheral neuropathy and nephropathy, hypertension, coronary artery disease, stroke, chronic gout, hyperlipidemia, GERD presents today for uncontrolled type 2 diabetes    Patient recently moved from [place] to [place] to live with his son around the end of 2023.    A1c 9.3% in January 2024. He reports that it came down from 11.4% in October 2023.    Current diabetes regimen  Lantus 50 units AM and 50 units PM   Metformin 1000 mg twice a day with meal  Glipizide XR 15 mg daily  Novolog sliding scale 4 times a day     Was on Trulicity but has been waiting for PA process.        Diet habit: eating at home, eat a lot of vegetable, salad, fruit  BF: toast, egg  Lunch: salad  Dinner: salad  Beverages: water, coffee, unsweetened tea    DM complications:  Retinopathy: early macular degeneration, last exam over a year in [place]   Nephropathy: positive urine microalbumin  Neuropathy: +constant pain, , difficulty to walk, fall often    Past Medical History  Past Medical History:   Diagnosis Date    COPD exacerbation (H)     Coronary artery disease     Diabetes (H)     History of stroke     2-3 strokes    Hyperlipidemia     Hypertension     Myocardial infarction (H)     Neuropathy     Sleep apnea        Allergies  No Known Allergies    Medications  Current Outpatient Medications   Medication Sig Dispense Refill    allopurinol (ZYLOPRIM) 100 MG tablet Take 1 tablet (100 mg) by mouth daily 90 tablet 1    aspirin 81 MG EC tablet Take 81 mg by mouth daily      atorvastatin (LIPITOR) 80 MG tablet Take 1 tablet (80 mg) by mouth daily 90 tablet 1    B-D U/F insulin pen needle       capsaicin (ZOSTRIX) 0.025 % external cream Apply topically to hands and feet 3 times daily 120 mL 1    Continuous Blood Gluc  (FREESTYLE AIDA 3 READER) ABUNDIO Use as directed 1 each 1    Continuous Blood Gluc Sensor (FREESTYLE AIDA 3 SENSOR) [facility] 1 each every  14 days Use 1 sensor every 14 days. 2 each 5    CONTOUR NEXT TEST test strip       cyanocobalamin (CYANOCOBALAMIN) 1000 MCG/ML injection       DULoxetine (CYMBALTA) 60 MG capsule Take 1 capsule (60 mg) by mouth daily 90 capsule 0    fenofibrate (TRICOR) 145 MG tablet Take 1 tablet (145 mg) by mouth daily 90 tablet 1    gabapentin (NEURONTIN) 400 MG capsule       glipiZIDE (GLUCOTROL XL) 5 MG 24 hr tablet       isosorbide mononitrate (IMDUR) 30 MG 24 hr tablet Take 1 tablet (30 mg) by mouth daily 90 tablet 1    LANTUS SOLOSTAR 100 UNIT/ML soln Inject 50 Units Subcutaneous 2 times daily 15 mL 1    loratadine (CLARITIN) 10 MG tablet       metFORMIN (GLUCOPHAGE) 1000 MG tablet Take 1 tablet (1,000 mg) by mouth 2 times daily (with meals) 180 tablet 1    NOVOLOG FLEXPEN 100 UNIT/ML soln INJECT 0-50 UNITS SUBQ THREE TIMES DAILY AS NEEDED PER SLIDING SCALE 15 mL 1    omeprazole (PRILOSEC) 20 MG DR capsule Take 1 capsule (20 mg) by mouth daily 90 capsule 0    pregabalin (LYRICA) 150 MG capsule       SURE COMFORT PEN NEEDLES 31G X 8 MM miscellaneous USE TO INJECT INSULIN, SEVEN PER  each 3    TRULICITY 3 MG/0.5ML SOPN INJECT 3MG SUBCUTANEOUSLY EVERY WEEK      VENTOLIN  (90 Base) MCG/ACT inhaler Inhale 1-2 puffs into the lungs every 6 hours as needed for shortness of breath, wheezing or cough 18 g 1     Family History  family history is not on file.  Social History  Social History     Tobacco Use    Smoking status: Former     Types: Cigarettes     Quit date:      Years since quittin.1    Smokeless tobacco: Never   Vaping Use    Vaping Use: Never used   Currently on disability due to neuropathy    ROS  10 points ROS were negative otherwise mentioned in HPI      Physical Exam  Limited due to virtual visit  Constitutional: no distress, comfortable, pleasant   Eyes: anicteric  Skin:no jaundice   Neurological: cranial nerves intact  Psychological: appropriate mood       RESULTS  I have personally reviewed  labs and images. I also reviewed labs with patient and discussed the result and plan of care.    Lab Results   Component Value Date    A1C 9.3 01/29/2024      Latest Ref Rng 1/12/2024  8:40 AM 1/29/2024  11:46 AM   ENDO DIABETES      ALT 0 - 70 U/L 26     AST 0 - 45 U/L 18     Cholesterol <200 mg/dL 164     LDL Cholesterol Calculated <=100 mg/dL 76     HDL Cholesterol >=40 mg/dL 29 (L)     Non HDL Cholesterol <130 mg/dL 135 (H)     Triglycerides <150 mg/dL 297 (H)     TRIG (EXT) <150 mg/dL 297 (H)     Creatinine 0.67 - 1.17 mg/dL  0.68    Albumin Urine mg/L mg/L  58.1    Albumin Urine mg/g Cr 0.00 - 17.00 mg/g Cr  68.60 (H)       Legend:  (L) Low  (H) High    ASSESSMENT:    Bryant is a 63 year old male with past medical history of longstanding uncontrolled diabetes complicated with peripheral neuropathy and nephropathy, hypertension, coronary artery disease, chronic gout, hyperlipidemia, GERD presents today for uncontrolled type 2 diabetes    1) uncontrolled type 2 diabetes: recent A1c 9.3% (came down from 11% in October). TIR is not at goal. His diabetes is complicated by peripheral neuropathy and nephropathy. He has CAD. I think he would benefit from adding SGLT2 inhibitor. Discussed benefit of SGLT2 inhibitor for reduction cardiorenal risk.     2) DM complications:  Retinopathy: early macular degeneration, last exam over a year in [place] -- refer eye exam  Nephropathy: positive urine microalbumin  Neuropathy: +constant pain, , difficulty to walk, fall often    PLAN:   - start Jardiance 10 mg daily  - Lantus 50 units AM and 50 units PM   - Metformin 1000 mg twice a day with meal  - Glipizide XR 15 mg daily  - Novolog sliding scale 4 times a day   - resend Susu 3 to pharmacy  - refer ophthalmology for eye exam    See ASHLEY De Anda pharmD in 4-6 weeks  See PA in 3 months  See MD in 6 months    Joined the call at 2/23/2024, 3:24:01 pm.  Left the call at 2/23/2024, 3:39:31 pm.  You were on the call for 15  minutes 29     External notes/medical records independently reviewed, labs and imaging independently reviewed, medical management and tests to be discussed/communicated to patient.    Time: I spent 45 minutes spent on the date of the encounter preparing to see patient (including chart review and preparation), obtaining and or reviewing additional medical history, performing a physical exam and evaluation, documenting clinical information in the electronic health record, independently interpreting results, communicating results to the patient and coordinating care.    Davon Hinds MD  Division of Diabetes and Endocrinology  Department of Medicine

## 2024-02-23 NOTE — NURSING NOTE
Is the patient currently in the state of [place]? YES    Visit mode:VIDEO    If the visit is dropped, the patient can be reconnected by: VIDEO VISIT: Send to e-mail at: [email or web address]    Will anyone else be joining the visit? NO  (If patient encounters technical issues they should call [phone])    How would you like to obtain your AVS? MyChart    Are changes needed to the allergy or medication list? Pt stated no changes to allergies and Pt stated no med changes    Reason for visit: Follow Up    Jennifer Dwyer LPN LPN

## 2024-02-26 DIAGNOSIS — E11.42 TYPE 2 DIABETES MELLITUS WITH DIABETIC POLYNEUROPATHY, WITH LONG-TERM CURRENT USE OF INSULIN (H): Primary | ICD-10-CM

## 2024-02-26 DIAGNOSIS — Z79.4 TYPE 2 DIABETES MELLITUS WITH DIABETIC POLYNEUROPATHY, WITH LONG-TERM CURRENT USE OF INSULIN (H): Primary | ICD-10-CM

## 2024-02-26 RX ORDER — LIRAGLUTIDE 6 MG/ML
INJECTION SUBCUTANEOUS
Qty: 27 ML | Refills: 3 | Status: SHIPPED | OUTPATIENT
Start: 2024-02-26 | End: 2024-06-11

## 2024-02-26 NOTE — TELEPHONE ENCOUNTER
PRIOR AUTHORIZATION DENIED    Medication: TRULICITY 3 MG/0.5ML SC SOPN  Insurance Company: [employer] - Phone [phone] Fax [phone]  Denial Date:    Denial Reason(s):     Appeal Information:    Patient Notified: Clinic to discuss next steps

## 2024-02-29 ENCOUNTER — TELEPHONE (OUTPATIENT)
Dept: FAMILY MEDICINE | Facility: CLINIC | Age: 64
End: 2024-02-29

## 2024-02-29 ENCOUNTER — OFFICE VISIT (OUTPATIENT)
Dept: OPTOMETRY | Facility: CLINIC | Age: 64
End: 2024-02-29
Attending: INTERNAL MEDICINE
Payer: COMMERCIAL

## 2024-02-29 DIAGNOSIS — H52.11 MYOPIA, RIGHT: ICD-10-CM

## 2024-02-29 DIAGNOSIS — Z79.4 TYPE 2 DIABETES MELLITUS WITH DIABETIC POLYNEUROPATHY, WITH LONG-TERM CURRENT USE OF INSULIN (H): ICD-10-CM

## 2024-02-29 DIAGNOSIS — H52.223 REGULAR ASTIGMATISM OF BOTH EYES: ICD-10-CM

## 2024-02-29 DIAGNOSIS — H25.043 POSTERIOR SUBCAPSULAR POLAR SENILE CATARACT OF BOTH EYES: ICD-10-CM

## 2024-02-29 DIAGNOSIS — Z01.01 VISION EXAM WITH ABNORMAL FINDINGS: Primary | ICD-10-CM

## 2024-02-29 DIAGNOSIS — H52.4 PRESBYOPIA: ICD-10-CM

## 2024-02-29 DIAGNOSIS — E11.42 TYPE 2 DIABETES MELLITUS WITH DIABETIC POLYNEUROPATHY, WITH LONG-TERM CURRENT USE OF INSULIN (H): ICD-10-CM

## 2024-02-29 PROCEDURE — 92004 COMPRE OPH EXAM NEW PT 1/>: CPT | Performed by: OPTOMETRIST

## 2024-02-29 PROCEDURE — 92015 DETERMINE REFRACTIVE STATE: CPT | Performed by: OPTOMETRIST

## 2024-02-29 ASSESSMENT — REFRACTION_MANIFEST
OD_SPHERE: -1.50
OD_CYLINDER: +1.00
OD_SPHERE: -2.00
OD_CYLINDER: +0.50
OS_AXIS: 080
OS_ADD: +2.00
OS_SPHERE: -0.75
OS_CYLINDER: +0.50
OS_SPHERE: -0.25
OD_AXIS: 016
OS_AXIS: 091
OD_ADD: +2.00
OS_CYLINDER: +0.50
OD_AXIS: 005

## 2024-02-29 ASSESSMENT — TONOMETRY
IOP_METHOD: APPLANATION
OS_IOP_MMHG: 18
OD_IOP_MMHG: 18

## 2024-02-29 ASSESSMENT — KERATOMETRY
OD_AXISANGLE2_DEGREES: 190
OD_AXISANGLE_DEGREES: 090
OD_K2POWER_DIOPTERS: 43.75
OS_AXISANGLE_DEGREES: 077
OD_K1POWER_DIOPTERS: 43.75
OS_K2POWER_DIOPTERS: 44.50
OS_K1POWER_DIOPTERS: 44.00
OS_AXISANGLE2_DEGREES: 167

## 2024-02-29 ASSESSMENT — CUP TO DISC RATIO
OD_RATIO: 0.3
OS_RATIO: 0.3

## 2024-02-29 ASSESSMENT — VISUAL ACUITY
OD_PH_SC+: -1
OD_SC: 20/50
OS_PH_SC: 20/40
METHOD: SNELLEN - LINEAR
OS_SC: 20/50
OD_SC: J5
OD_PH_SC: 20/40
OS_SC: J5
OS_SC+: -1

## 2024-02-29 ASSESSMENT — CONF VISUAL FIELD
OD_SUPERIOR_TEMPORAL_RESTRICTION: 0
OD_SUPERIOR_NASAL_RESTRICTION: 0
OS_INFERIOR_TEMPORAL_RESTRICTION: 0
OD_INFERIOR_TEMPORAL_RESTRICTION: 0
OD_NORMAL: 1
METHOD: COUNTING FINGERS
OS_NORMAL: 1
OS_SUPERIOR_TEMPORAL_RESTRICTION: 0
OD_INFERIOR_NASAL_RESTRICTION: 0
OS_INFERIOR_NASAL_RESTRICTION: 0
OS_SUPERIOR_NASAL_RESTRICTION: 0

## 2024-02-29 ASSESSMENT — SLIT LAMP EXAM - LIDS
COMMENTS: NORMAL
COMMENTS: NORMAL

## 2024-02-29 NOTE — LETTER
2/29/2024         RE: Bryant Buck  [address] [address] [address]        Dear Colleague,    Thank you for referring your patient, Bryant Buck, to the [facility]. No diabetic retinopathy was found at eye exam.  Please see a copy of my visit note below.    Chief Complaint   Patient presents with     Diabetic Eye Exam     Accompanied by wife  Chief Complaint(s) and History of Present Illness(es)       Diabetic Eye Exam            Diabetes 2 x 10 yrs , denies vision fluctuations  , takes Victosa, Jardiance, Insuliln, metformin         Lab Results   Component Value Date    A1C 9.3 01/29/2024            Last Eye Exam: 1 yr, was told he had macular degeneration     Dilated Previously: Yes, side effects of dilation explained today    What are you currently using to see?  Glasses - mostly used for reading (did not bring glasses today) lined bifocal     Distance Vision Acuity: Noticed gradual change in both eyes    Near Vision Acuity: Not satisfied - felt glasses didn't really work while wearing glasses     Eye Comfort: good  Do you use eye drops? : No  Occupation or Hobbies: reads at [employer],         Medical, surgical and family histories reviewed and updated 2/29/2024.       OBJECTIVE: See Ophthalmology exam    ASSESSMENT:    ICD-10-CM    1. Vision exam with abnormal findings  Z01.01       2. Type 2 diabetes mellitus with diabetic polyneuropathy, with long-term current use of insulin (H)  E11.42 Adult Eye [facility] Referral    Z79.4       3. Posterior subcapsular polar senile cataract of both eyes L>R  H25.043       4. Myopia, right  H52.11       5. Regular astigmatism of both eyes  H52.223       6. Presbyopia  H52.4           PLAN:    Bryant Buck aware  eye exam results will be sent to  Sahil Saldana and Dr Martinez   Patient Instructions   Fill glasses prescription  Allow 2 weeks to adapt to change in glasses  Keep blood sugar under  good control  Return in 1 year for diabetic eye exam      Blood sugar and blood pressure control are very important in the prevention of ocular complications from diabetes.       Wanda Bundy, OD  [phone]                    Again, thank you for allowing me to participate in the care of your patient.        Sincerely,        Wanda Bundy, OD

## 2024-02-29 NOTE — TELEPHONE ENCOUNTER
[id number] - [place], [place] - [address]    [phone]     In calling the pharmacy they state that Bryant is looking for a new Susu receive and his insurance will not pay for another one until 10/10/2024.  I asked them why he wanted it and they do not know why. I called that patient and he reports that he doesn't need a  he just got a new one and it is only about 1 month old.  I asked him if he needed any Susu items and he does not.  No further action needed.  Thank you. Una Whyte R.N.

## 2024-02-29 NOTE — TELEPHONE ENCOUNTER
Fax message: Continuous Blood Gluc  (FREESTYLE AIDA 3 READER) ABUNDIO  quantity dispensed exceeds maximum allowed. Qty limit 1,000 per 365 days. Qty limit resets on 10/10/24.    3rd request 2/29  2nd request 2/22

## 2024-02-29 NOTE — PATIENT INSTRUCTIONS
Fill glasses prescription  Allow 2 weeks to adapt to change in glasses  Keep blood sugar under good control  Return in 1 year for diabetic eye exam      Blood sugar and blood pressure control are very important in the prevention of ocular complications from diabetes.       Wanda Bundy, OD  [phone]

## 2024-02-29 NOTE — PROGRESS NOTES
Chief Complaint   Patient presents with    Diabetic Eye Exam     Accompanied by wife  Chief Complaint(s) and History of Present Illness(es)       Diabetic Eye Exam            Diabetes 2 x 10 yrs , denies vision fluctuations  , takes Victosa, Jardiance, Insuliln, metformin         Lab Results   Component Value Date    A1C 9.3 01/29/2024            Last Eye Exam: 1 yr, was told he had macular degeneration     Dilated Previously: Yes, side effects of dilation explained today    What are you currently using to see?  Glasses - mostly used for reading (did not bring glasses today) lined bifocal     Distance Vision Acuity: Noticed gradual change in both eyes    Near Vision Acuity: Not satisfied - felt glasses didn't really work while wearing glasses     Eye Comfort: good  Do you use eye drops? : No  Occupation or Hobbies: reads at "[employer]         [facility],         Medical, surgical and family histories reviewed and updated 2/29/2024.       OBJECTIVE: See Ophthalmology exam    ASSESSMENT:    ICD-10-CM    1. Vision exam with abnormal findings  Z01.01       2. Type 2 diabetes mellitus with diabetic polyneuropathy, with long-term current use of insulin (H)  E11.42 Adult Eye [facility] Referral    Z79.4       3. Posterior subcapsular polar senile cataract of both eyes L>R  H25.043       4. Myopia, right  H52.11       5. Regular astigmatism of both eyes  H52.223       6. Presbyopia  H52.4           PLAN:    Bryant Buck aware  eye exam results will be sent to  Sahil Saldana and Dr Martinez   Patient Instructions   Fill glasses prescription  Allow 2 weeks to adapt to change in glasses  Keep blood sugar under good control  Return in 1 year for diabetic eye exam      Blood sugar and blood pressure control are very important in the prevention of ocular complications from diabetes.       Wanda Bundy, OD  [phone]

## 2024-03-01 ENCOUNTER — VIRTUAL VISIT (OUTPATIENT)
Dept: EDUCATION SERVICES | Facility: CLINIC | Age: 64
End: 2024-03-01
Payer: COMMERCIAL

## 2024-03-01 DIAGNOSIS — Z79.4 TYPE 2 DIABETES MELLITUS WITH DIABETIC POLYNEUROPATHY, WITH LONG-TERM CURRENT USE OF INSULIN (H): Primary | ICD-10-CM

## 2024-03-01 DIAGNOSIS — E11.42 TYPE 2 DIABETES MELLITUS WITH DIABETIC POLYNEUROPATHY, WITH LONG-TERM CURRENT USE OF INSULIN (H): Primary | ICD-10-CM

## 2024-03-01 NOTE — LETTER
"    3/1/2024         RE: Bryant Buck  [address] [facility] [address]        Dear Colleague,    Thank you for referring your patient, Bryant Buck, to the [facility]. Please see a copy of my visit note below.    Diabetes Education Follow-up    Subjective/Objective:    Last date of communication was: 1/5/24.    Diabetes is being managed with:    Diabetes Medication(s)       Biguanides       metFORMIN (GLUCOPHAGE) 1000 MG tablet Take 1 tablet (1,000 mg) by mouth 2 times daily (with meals)       Insulin       LANTUS SOLOSTAR 100 UNIT/ML soln Inject 50 Units Subcutaneous 2 times daily     NOVOLOG FLEXPEN 100 UNIT/ML soln INJECT 0-50 UNITS SUBQ THREE TIMES DAILY AS NEEDED PER SLIDING SCALE       Sodium-Glucose Co-Transporter 2 (SGLT2) Inhibitors       empagliflozin (JARDIANCE) 10 MG TABS tablet Take 1 tablet (10 mg) by mouth daily       Sulfonylureas       glipiZIDE (GLUCOTROL XL) 5 MG 24 hr tablet        Incretin Mimetic Agents       liraglutide (VICTOZA) 18 MG/3ML solution Start Victoza 0.6 mg daily for 2 wks. If tolerated, increase to 1.2 mg daily for 2 wks. If tolerated, increase to 1.8 mg thereafter.            Assessment:    Pt did not join scheduled video visit, thus called and briefly spoke with pt. Currently using CGM, last report noted per Endocrinology 2/23/24. Pt reports \"it has really helped improve his numbers\". Jardiance added, pt tolerating well. States \"my appetite is less, so that is a good thing\". Pt c/o severe pain due to neuropathy, wondering \"if anything else can be done\". Advised continued improvement with glycemic control, and continued f/u with care team.      Plan/Response:  See Patient Instructions for co-developed, patient-stated behavior change goals.  No changes in the patient's current treatment plan.    Gretel Ferreira RD, [facility]  Diabetes   Time spent: <10 min  No charge visit    Any diabetes medication dose changes were made via " the CDE Protocol and Collaborative Practice Agreement with the patient's primary care provider and endocrinology provider. A copy of this encounter was shared with the provider.       Admission

## 2024-03-01 NOTE — PROGRESS NOTES
"Diabetes Education Follow-up    Subjective/Objective:    Last date of communication was: 1/5/24.    Diabetes is being managed with:    Diabetes Medication(s)       Biguanides       metFORMIN (GLUCOPHAGE) 1000 MG tablet Take 1 tablet (1,000 mg) by mouth 2 times daily (with meals)       Insulin       LANTUS SOLOSTAR 100 UNIT/ML soln Inject 50 Units Subcutaneous 2 times daily     NOVOLOG FLEXPEN 100 UNIT/ML soln INJECT 0-50 UNITS SUBQ THREE TIMES DAILY AS NEEDED PER SLIDING SCALE       Sodium-Glucose Co-Transporter 2 (SGLT2) Inhibitors       empagliflozin (JARDIANCE) 10 MG TABS tablet Take 1 tablet (10 mg) by mouth daily       Sulfonylureas       glipiZIDE (GLUCOTROL XL) 5 MG 24 hr tablet        Incretin Mimetic Agents       liraglutide (VICTOZA) 18 MG/3ML solution Start Victoza 0.6 mg daily for 2 wks. If tolerated, increase to 1.2 mg daily for 2 wks. If tolerated, increase to 1.8 mg thereafter.            Assessment:    Pt did not join scheduled video visit, thus called and briefly spoke with pt. Currently using CGM, last report noted per Endocrinology 2/23/24. Pt reports \"it has really helped improve his numbers\". Jarkatelynance added, pt tolerating well. States \"my appetite is less, so that is a good thing\". Pt c/o severe pain due to neuropathy, wondering \"if anything else can be done\". Advised continued improvement with glycemic control, and continued f/u with care team.      Plan/Response:  See Patient Instructions for co-developed, patient-stated behavior change goals.  No changes in the patient's current treatment plan.    Gretel Ferreira RD, [facility]  Diabetes   Time spent: <10 min  No charge visit    Any diabetes medication dose changes were made via the CDE Protocol and Collaborative Practice Agreement with the patient's primary care provider and endocrinology provider. A copy of this encounter was shared with the provider.    "

## 2024-03-14 ENCOUNTER — APPOINTMENT (OUTPATIENT)
Dept: OPTOMETRY | Facility: CLINIC | Age: 64
End: 2024-03-14
Payer: COMMERCIAL

## 2024-03-14 PROCEDURE — 92341 FIT SPECTACLES BIFOCAL: CPT | Performed by: OPTOMETRIST

## 2024-03-26 DIAGNOSIS — E11.42 TYPE 2 DIABETES MELLITUS WITH DIABETIC POLYNEUROPATHY, WITH LONG-TERM CURRENT USE OF INSULIN (H): ICD-10-CM

## 2024-03-26 DIAGNOSIS — Z79.4 TYPE 2 DIABETES MELLITUS WITH DIABETIC POLYNEUROPATHY, WITH LONG-TERM CURRENT USE OF INSULIN (H): ICD-10-CM

## 2024-03-26 RX ORDER — INSULIN ASPART 100 [IU]/ML
INJECTION, SOLUTION INTRAVENOUS; SUBCUTANEOUS
Qty: 15 ML | Refills: 1 | Status: SHIPPED | OUTPATIENT
Start: 2024-03-26 | End: 2024-05-28

## 2024-04-08 ENCOUNTER — TELEPHONE (OUTPATIENT)
Dept: ENDOCRINOLOGY | Facility: CLINIC | Age: 64
End: 2024-04-08
Payer: COMMERCIAL

## 2024-04-08 ENCOUNTER — VIRTUAL VISIT (OUTPATIENT)
Dept: PHARMACY | Facility: CLINIC | Age: 64
End: 2024-04-08
Attending: INTERNAL MEDICINE
Payer: COMMERCIAL

## 2024-04-08 DIAGNOSIS — Z79.4 TYPE 2 DIABETES MELLITUS WITH DIABETIC POLYNEUROPATHY, WITH LONG-TERM CURRENT USE OF INSULIN (H): Primary | ICD-10-CM

## 2024-04-08 DIAGNOSIS — I10 HYPERTENSION GOAL BP (BLOOD PRESSURE) < 140/90: ICD-10-CM

## 2024-04-08 DIAGNOSIS — E78.5 HYPERLIPIDEMIA LDL GOAL <100: ICD-10-CM

## 2024-04-08 DIAGNOSIS — E11.65 TYPE 2 DIABETES MELLITUS WITH HYPERGLYCEMIA, UNSPECIFIED WHETHER LONG TERM INSULIN USE (H): Primary | ICD-10-CM

## 2024-04-08 DIAGNOSIS — E11.42 TYPE 2 DIABETES MELLITUS WITH DIABETIC POLYNEUROPATHY, WITH LONG-TERM CURRENT USE OF INSULIN (H): Primary | ICD-10-CM

## 2024-04-08 PROCEDURE — 99605 MTMS BY PHARM NP 15 MIN: CPT | Mod: 95

## 2024-04-08 PROCEDURE — 99607 MTMS BY PHARM ADDL 15 MIN: CPT | Mod: 95

## 2024-04-08 NOTE — PROGRESS NOTES
Medication Therapy Management (MTM) Encounter    ASSESSMENT:                            Medication Adherence/Access: See below for considerations    Type 2 Diabetes: A1C above goal of < 7%.  Would benefit from upgrade of Victoza to Ozempic to help achieve glycemic goals and ideally reduce insulin need.  Will complete prior authorization today and follow patient to titrate once approved    Hypertension: Achieving JNC 8 goal of less than 140/90.  Continue current therapy.    Hyperlipidemia: Controlled on high intensity statin.    Due to time constraints, I was only able to assess the above with the patient today. Will follow-up on other disease states in future encounters      PLAN:                            PA for Ozempic in place to replace Victoza. Will follow patient to titrate once approved.    Endocrine Team & Next Follow-Up:  No follow-up scheduled with Dr. Mays yet  2024 with Surendra    SUBJECTIVE/OBJECTIVE:                          Bryant Buck is a 63 year old male called for an initial visit. He was referred to me from Dr. Mays.      Reason for visit: Medication Therapy Management (MTM).      Allergies/ADRs: Reviewed in chart  Past Medical History: Reviewed in chart  Social History     Tobacco Use    Smoking status: Former     Current packs/day: 0.00     Types: Cigarettes     Quit date: 2013     Years since quittin.2    Smokeless tobacco: Never   Vaping Use    Vaping status: Never Used   Substance Use Topics    Alcohol use: Never    Drug use: Never        Medication Adherence/Access: Adherence is reflected well in the dispense report. are [facility]    Type 2 Diabetes:   Diabetes Medication(s)       Biguanides       metFORMIN (GLUCOPHAGE) 1000 MG tablet Take 1 tablet (1,000 mg) by mouth 2 times daily (with meals)       Insulin       LANTUS SOLOSTAR 100 UNIT/ML soln Inject 50 Units Subcutaneous 2 times daily     NOVOLOG FLEXPEN 100 UNIT/ML soln INJECT 0-50 UNITS SUBQ THREE TIMES DAILY AS NEEDED  "PER SLIDING SCALE (about 20 units with meals)       Sodium-Glucose Co-Transporter 2 (SGLT2) Inhibitors       empagliflozin (JARDIANCE) 10 MG TABS tablet Take 1 tablet (10 mg) by mouth daily.  Tolerating well.       Sulfonylureas       glipiZIDE (GLUCOTROL XL) 5 MG 24 hr tablet Take 3 tablets by mouth daily       Incretin Mimetic Agents       liraglutide (VICTOZA) 18 MG/3ML solution 1.8 mg daily     Neuropathy in feet and hands.            Urine Albumin:   Lab Results   Component Value Date    UMALCR 68.60 (H) 01/29/2024      Lab Results   Component Value Date    A1C 9.3 01/29/2024     Lab Results   Component Value Date    GFRESTIMATED >90 01/29/2024     Most Recent Immunizations   Administered Date(s) Administered    COVID-19 12+ (2023-24) (Pfizer) 12/14/2023    Influenza Vaccine >6 months,quad, PF 12/14/2023      Estimated body mass index is 32.99 kg/m  as calculated from the following:    Height as of 1/29/24: 5' 8\" (1.727 m).    Weight as of 1/29/24: 217 lb (98.4 kg).     Hypertension: Current medications:   Imdur 30 mg daily  No reports of side effects.     BP Readings from Last 6 Encounters:   01/29/24 (!) 156/86   01/04/24 (!) 168/93   12/14/23 136/85        Hyperlipidemia: Current medications:   Lipitor 80 mg daily  No reports of side effects.     Recent Labs   Lab Test 01/12/24  0840   CHOL 164   HDL 29*   LDL 76   TRIG 297*     BP Readings from Last 1 Encounters:   01/29/24 (!) 156/86     Pulse Readings from Last 1 Encounters:   01/29/24 79     Wt Readings from Last 1 Encounters:   01/29/24 217 lb (98.4 kg)     Ht Readings from Last 1 Encounters:   01/29/24 5' 8\" (1.727 m)     Estimated body mass index is 32.99 kg/m  as calculated from the following:    Height as of 1/29/24: 5' 8\" (1.727 m).    Weight as of 1/29/24: 217 lb (98.4 kg).    Temp Readings from Last 1 Encounters:   01/29/24 97.2  F (36.2  C) (Tympanic)       ----------------      I spent 15 minutes with this patient today. Dr. Mays was provided " the recommendations above via routed note and is the authorizing prescriber for this visit through the pharmacist collaborative practice agreement. A copy of the visit note was provided to the patient's provider(s).    The patient was given to the patient a summary of these recommendations.     Surendra Hernandez, PharmD, [facility]  Endocrine & Diabetes [facility] Pharmacist  [address] [place] [address]  Direct Voicemail: [phone]    Telemedicine Visit Details  Type of service:  Telephone visit  Start Time: 9AM  End Time: 9:15AM  Originating Location (pt. Location): Home  Provider has received verbal consent for a visit from the patient? Yes     Medication Therapy Recommendations  Type 2 diabetes mellitus with hyperglycemia, unspecified whether long term insulin use (H)    Current Medication: liraglutide (VICTOZA) 18 MG/3ML solution   Rationale: More effective medication available - Ineffective medication - Effectiveness   Recommendation: Change Medication - Ozempic (0.25 or 0.5 MG/DOSE) 2 MG/1.5ML Sopn   Status: Accepted per CPA

## 2024-04-09 ENCOUNTER — PATIENT OUTREACH (OUTPATIENT)
Dept: FAMILY MEDICINE | Facility: CLINIC | Age: 64
End: 2024-04-09
Payer: COMMERCIAL

## 2024-04-09 NOTE — TELEPHONE ENCOUNTER
Patient Quality Outreach    Patient is due for the following:   Colon Cancer Screening  Physical Preventive Adult Physical      Topic Date Due    Pneumococcal Vaccine (1 of 2 - PCV) Never done    Hepatitis A Vaccine (1 of 2 - Risk 2-dose series) Never done    Diptheria Tetanus Pertussis (DTAP/TDAP/TD) Vaccine (1 - Tdap) Never done    Zoster (Shingles) Vaccine (1 of 2) Never done    Hepatitis B Vaccine (1 of 3 - Risk 3-dose series) Never done       Next Steps:   Schedule a Adult Preventative    Type of outreach:    Sent Dajie message.      Questions for provider review:    None           Trina Velez, CMA

## 2024-04-15 NOTE — TELEPHONE ENCOUNTER
Please put in PA for Ozempic and let me know if denied. Failed Victoza, glipizide, insulin glargine, insulin aspart, metformin. Type 2 diabetes E11.9    Surendra Hernandez, PharmD, [facility]  Endocrine & Diabetes [place] Pharmacist  [address] [place] [address]

## 2024-04-16 ENCOUNTER — TELEPHONE (OUTPATIENT)
Dept: ENDOCRINOLOGY | Facility: CLINIC | Age: 64
End: 2024-04-16
Payer: COMMERCIAL

## 2024-04-16 NOTE — TELEPHONE ENCOUNTER
PA Initiation Key: [id number]    Medication: OZEMPIC (0.25 OR 0.5 MG/DOSE) 2 MG/3ML SC SOPN  Insurance Company: [employer] - Phone [phone] Fax [phone]  Pharmacy Filling the Rx: [id number] - [place], [place] - [address]   Filling Pharmacy Phone: [phone]  Filling Pharmacy Fax: [phone]  Start Date: 4/16/2024

## 2024-04-17 NOTE — TELEPHONE ENCOUNTER
Prior Authorization Approval    Medication: OZEMPIC (0.25 OR 0.5 MG/DOSE) 2 MG/3ML SC SOPN  Authorization Effective Date: 4/17/2024  Authorization Expiration Date: 4/17/2025  Approved Dose/Quantity:    Reference #: Key: [id number]   Insurance Company: [employer] - Phone [phone] Fax [phone]  Expected CoPay: $    CoPay Card Available: No    Financial Assistance Needed:    Which Pharmacy is filling the prescription: [id number] - [place], [place] [address]   Pharmacy Notified: Y  Patient Notified: MARQUES

## 2024-04-21 DIAGNOSIS — G62.9 PERIPHERAL POLYNEUROPATHY: Primary | ICD-10-CM

## 2024-04-30 ENCOUNTER — TRANSFERRED RECORDS (OUTPATIENT)
Dept: HEALTH INFORMATION MANAGEMENT | Facility: CLINIC | Age: 64
End: 2024-04-30
Payer: COMMERCIAL

## 2024-05-01 ENCOUNTER — MYC REFILL (OUTPATIENT)
Dept: ENDOCRINOLOGY | Facility: CLINIC | Age: 64
End: 2024-05-01
Payer: COMMERCIAL

## 2024-05-01 DIAGNOSIS — Z79.4 TYPE 2 DIABETES MELLITUS WITH DIABETIC POLYNEUROPATHY, WITH LONG-TERM CURRENT USE OF INSULIN (H): ICD-10-CM

## 2024-05-01 DIAGNOSIS — E11.42 TYPE 2 DIABETES MELLITUS WITH DIABETIC POLYNEUROPATHY, WITH LONG-TERM CURRENT USE OF INSULIN (H): ICD-10-CM

## 2024-05-01 RX ORDER — GABAPENTIN 400 MG/1
400 CAPSULE ORAL 3 TIMES DAILY
Qty: 90 CAPSULE | Refills: 1 | Status: SHIPPED | OUTPATIENT
Start: 2024-05-01

## 2024-05-01 RX ORDER — GABAPENTIN 400 MG/1
CAPSULE ORAL
OUTPATIENT
Start: 2024-05-01

## 2024-05-02 NOTE — TELEPHONE ENCOUNTER
Protocol failure in error. A1c result in place JAN 29 2024   Darline Watts RN on 5/2/2024 at 9:48 AM

## 2024-05-19 ENCOUNTER — HEALTH MAINTENANCE LETTER (OUTPATIENT)
Age: 64
End: 2024-05-19

## 2024-05-28 DIAGNOSIS — E11.42 TYPE 2 DIABETES MELLITUS WITH DIABETIC POLYNEUROPATHY, WITH LONG-TERM CURRENT USE OF INSULIN (H): ICD-10-CM

## 2024-05-28 DIAGNOSIS — Z79.4 TYPE 2 DIABETES MELLITUS WITH DIABETIC POLYNEUROPATHY, WITH LONG-TERM CURRENT USE OF INSULIN (H): ICD-10-CM

## 2024-05-28 RX ORDER — INSULIN GLARGINE 100 [IU]/ML
50 INJECTION, SOLUTION SUBCUTANEOUS 2 TIMES DAILY
Qty: 15 ML | Refills: 0 | Status: SHIPPED | OUTPATIENT
Start: 2024-05-28 | End: 2024-06-27

## 2024-05-29 RX ORDER — INSULIN ASPART 100 [IU]/ML
INJECTION, SOLUTION INTRAVENOUS; SUBCUTANEOUS
Qty: 15 ML | Refills: 1 | Status: SHIPPED | OUTPATIENT
Start: 2024-05-29 | End: 2024-07-31

## 2024-06-11 ENCOUNTER — TELEPHONE (OUTPATIENT)
Dept: ENDOCRINOLOGY | Facility: CLINIC | Age: 64
End: 2024-06-11
Payer: COMMERCIAL

## 2024-06-11 ENCOUNTER — VIRTUAL VISIT (OUTPATIENT)
Dept: CARDIOLOGY | Facility: CLINIC | Age: 64
End: 2024-06-11
Attending: INTERNAL MEDICINE
Payer: COMMERCIAL

## 2024-06-11 DIAGNOSIS — Z79.4 TYPE 2 DIABETES MELLITUS WITH DIABETIC POLYNEUROPATHY, WITH LONG-TERM CURRENT USE OF INSULIN (H): Primary | ICD-10-CM

## 2024-06-11 DIAGNOSIS — E11.65 TYPE 2 DIABETES MELLITUS WITH HYPERGLYCEMIA, UNSPECIFIED WHETHER LONG TERM INSULIN USE (H): Primary | ICD-10-CM

## 2024-06-11 DIAGNOSIS — E11.42 TYPE 2 DIABETES MELLITUS WITH DIABETIC POLYNEUROPATHY, WITH LONG-TERM CURRENT USE OF INSULIN (H): Primary | ICD-10-CM

## 2024-06-11 NOTE — PATIENT INSTRUCTIONS
INCREASE Ozempic to 1 mg weekly    DECREASE Lantus to 46 units twice daily     DECREASE glipizide to 10 mg (two pills) daily    Please let me know if you have any lows < 70

## 2024-06-11 NOTE — Clinical Note
2024      RE: Bryant Buck  [address] [facility] [address]       Dear Colleague,    Thank you for the opportunity to participate in the care of your patient, Bryant Buck, at the [facility] [place] [facility]. Please see a copy of my visit note below.    Medication Therapy Management (MTM) Encounter    ASSESSMENT:                            Medication Adherence/Access: See below for considerations    Type 2 Diabetes: A1C above goal of < 7%.  Would benefit from upgrade of Victoza to Ozempic to help achieve glycemic goals and ideally reduce insulin need.  Will complete prior authorization today and follow patient to titrate once approved      PLAN:                            PA for Ozempic in place to replace Victoza. Will follow patient to titrate once approved.    Endocrine Team & Next Follow-Up:  No follow-up scheduled with Dr. Mays yet  *** with Surendra    SUBJECTIVE/OBJECTIVE:                          Bryant Buck is a 64 year old male called for a follow-up visit. He was referred to me from Dr. Mays.      Reason for visit: Medication Therapy Management (MTM).      Allergies/ADRs: Reviewed in chart  Past Medical History: Reviewed in chart  Social History     Tobacco Use    Smoking status: Former     Current packs/day: 0.00     Types: Cigarettes     Quit date: 2013     Years since quittin.4    Smokeless tobacco: Never   Vaping Use    Vaping status: Never Used   Substance Use Topics    Alcohol use: Never    Drug use: Never        Medication Adherence/Access: Adherence is reflected well in the dispense report. [facility] PMAP    Type 2 Diabetes:   Diabetes Medication(s)       Biguanides       metFORMIN (GLUCOPHAGE) 1000 MG tablet Take 1 tablet (1,000 mg) by mouth 2 times daily (with meals)       Insulin       LANTUS SOLOSTAR 100 UNIT/ML soln Inject 50 Units Subcutaneous 2 times daily     NOVOLOG FLEXPEN 100 UNIT/ML  "soln INJECT 0-50 UNITS SUBQ THREE TIMES DAILY AS NEEDED PER SLIDING SCALE       Sodium-Glucose Co-Transporter 2 (SGLT2) Inhibitors       empagliflozin (JARDIANCE) 10 MG TABS tablet Take 1 tablet (10 mg) by mouth daily       Sulfonylureas       glipiZIDE (GLUCOTROL XL) 5 MG 24 hr tablet Take 15 mg by mouth daily       Incretin Mimetic Agents       liraglutide (VICTOZA) 18 MG/3ML solution Start Victoza 0.6 mg daily for 2 wks. If tolerated, increase to 1.2 mg daily for 2 wks. If tolerated, increase to 1.8 mg thereafter.     semaglutide (OZEMPIC) 2 MG/3ML pen Inject subcu 0.5 mg weekly                    Pre-Ozempic         Urine Albumin:   Lab Results   Component Value Date    UMALCR 68.60 (H) 01/29/2024      Lab Results   Component Value Date    A1C 9.3 01/29/2024     Lab Results   Component Value Date    GFRESTIMATED >90 01/29/2024     BP Readings from Last 1 Encounters:   01/29/24 (!) 156/86     Pulse Readings from Last 1 Encounters:   01/29/24 79     Wt Readings from Last 1 Encounters:   01/29/24 98.4 kg (217 lb)     Ht Readings from Last 1 Encounters:   01/29/24 1.727 m (5' 8\")     Estimated body mass index is 32.99 kg/m  as calculated from the following:    Height as of 1/29/24: 1.727 m (5' 8\").    Weight as of 1/29/24: 98.4 kg (217 lb).    Temp Readings from Last 1 Encounters:   01/29/24 97.2  F (36.2  C) (Tympanic)       ----------------      I spent 15 minutes with this patient today. Dr. Mays was provided the recommendations above via routed note and is the authorizing prescriber for this visit through the pharmacist collaborative practice agreement. A copy of the visit note was provided to the patient's provider(s).    The patient was given to the patient a summary of these recommendations.     Surendra Hernandez, PharmD, [facility]  Endocrine & Diabetes [facility] Pharmacist  [address] [place] [address]  Direct Voicemail: [phone]    Telemedicine Visit Details  Type of service:  Telephone visit  Start " Time: 9AM  End Time: 9:15AM  Originating Location (pt. Location): Home  Provider has received verbal consent for a visit from the patient? Yes     Medication Therapy Recommendations  No medication therapy recommendations to display            Medication Therapy Management (MTM) Encounter    ASSESSMENT:                            Medication Adherence/Access: See below for considerations    Type 2 Diabetes: A1C above goal of < 7%.  Would benefit from upgrade of Victoza to Ozempic to help achieve glycemic goals and ideally reduce insulin need.  Will complete prior authorization today and follow patient to titrate once approved      PLAN:                            PA for Ozempic in place to replace Victoza. Will follow patient to titrate once approved.    Endocrine Team & Next Follow-Up:  No follow-up scheduled with Dr. Mays yet  *** with Surendra    SUBJECTIVE/OBJECTIVE:                          Bryant Buck is a 64 year old male called for a follow-up visit. He was referred to me from Dr. Mays.      Reason for visit: Medication Therapy Management (MTM).      Allergies/ADRs: Reviewed in chart  Past Medical History: Reviewed in chart  Social History     Tobacco Use     Smoking status: Former     Current packs/day: 0.00     Types: Cigarettes     Quit date: 2013     Years since quittin.4     Smokeless tobacco: Never   Vaping Use     Vaping status: Never Used   Substance Use Topics     Alcohol use: Never     Drug use: Never        Medication Adherence/Access: Adherence is reflected well in the dispense report. [facility] PMAP    Type 2 Diabetes:   Diabetes Medication(s)       Biguanides       metFORMIN (GLUCOPHAGE) 1000 MG tablet Take 1 tablet (1,000 mg) by mouth 2 times daily (with meals)       Insulin       LANTUS SOLOSTAR 100 UNIT/ML soln Inject 50 Units Subcutaneous 2 times daily     NOVOLOG FLEXPEN 100 UNIT/ML soln INJECT 0-50 UNITS SUBQ THREE TIMES DAILY AS NEEDED PER SLIDING SCALE       Sodium-Glucose  "Co-Transporter 2 (SGLT2) Inhibitors       empagliflozin (JARDIANCE) 10 MG TABS tablet Take 1 tablet (10 mg) by mouth daily       Sulfonylureas       glipiZIDE (GLUCOTROL XL) 5 MG 24 hr tablet Take 15 mg by mouth daily       Incretin Mimetic Agents       semaglutide (OZEMPIC) 2 MG/3ML pen Inject subcu 0.5 mg weekly                    Pre-Ozempic         Urine Albumin:   Lab Results   Component Value Date    UMALCR 68.60 (H) 01/29/2024      Lab Results   Component Value Date    A1C 9.3 01/29/2024     Lab Results   Component Value Date    GFRESTIMATED >90 01/29/2024     BP Readings from Last 1 Encounters:   01/29/24 (!) 156/86     Pulse Readings from Last 1 Encounters:   01/29/24 79     Wt Readings from Last 1 Encounters:   01/29/24 98.4 kg (217 lb)     Ht Readings from Last 1 Encounters:   01/29/24 1.727 m (5' 8\")     Estimated body mass index is 32.99 kg/m  as calculated from the following:    Height as of 1/29/24: 1.727 m (5' 8\").    Weight as of 1/29/24: 98.4 kg (217 lb).    Temp Readings from Last 1 Encounters:   01/29/24 97.2  F (36.2  C) (Tympanic)       ----------------      I spent 15 minutes with this patient today. Dr. Mays was provided the recommendations above via routed note and is the authorizing prescriber for this visit through the pharmacist collaborative practice agreement. A copy of the visit note was provided to the patient's provider(s).    The patient was given to the patient a summary of these recommendations.     Surendra Hernandez, PharmD, [facility]  Endocrine & Diabetes [facility] Pharmacist  [address] [place] [address]  Direct Voicemail: [phone]    Telemedicine Visit Details  Type of service:  Telephone visit  Start Time: 9AM  End Time: 9:15AM  Originating Location (pt. Location): Home  Provider has received verbal consent for a visit from the patient? Yes     Medication Therapy Recommendations  No medication therapy recommendations to display              Please do not hesitate to " contact me if you have any questions/concerns.     Sincerely,     Surendra Hernandez RPH

## 2024-06-11 NOTE — PROGRESS NOTES
Medication Therapy Management (MTM) Encounter    ASSESSMENT:                            Medication Adherence/Access: See below for considerations    Type 2 Diabetes: A1C above goal of < 7%.  Time in range now above goal of > 70% since upgrading Victoza to Ozempic earlier this year. Given tolerating well, would benefit from dose increase today. In light of current control, will have patient back off glipizide and Lantus doses to mitigate risk of hypoglycemia. End goal would be to remove glipizide completely and consolidate Lantus to once daily dosing. Will continue to follow to titrate.       PLAN:                            INCREASE Ozempic to 1 mg weekly    DECREASE Lantus to 46 units twice daily     DECREASE glipizide to 10 mg (two pills) daily    Please let me know if you have any lows < 70    Endocrine Team & Next Follow-Up:  No follow-up scheduled with Dr. Mays yet  7/10/2024 with Surendra    SUBJECTIVE/OBJECTIVE:                          Bryant Buck is a 64 year old male called for a follow-up visit. He was referred to me from Dr. Mays.      Reason for visit: Medication Therapy Management (MTM).      Allergies/ADRs: Reviewed in chart  Past Medical History: Reviewed in chart  Social History     Tobacco Use    Smoking status: Former     Current packs/day: 0.00     Types: Cigarettes     Quit date: 2013     Years since quittin.4    Smokeless tobacco: Never   Vaping Use    Vaping status: Never Used   Substance Use Topics    Alcohol use: Never    Drug use: Never        Medication Adherence/Access: Adherence is reflected well in the dispense report. [facility]    Type 2 Diabetes:   Diabetes Medication(s)       Biguanides       metFORMIN (GLUCOPHAGE) 1000 MG tablet Take 1 tablet (1,000 mg) by mouth 2 times daily (with meals)       Insulin       LANTUS SOLOSTAR 100 UNIT/ML soln Inject 50 Units Subcutaneous 2 times daily     NOVOLOG FLEXPEN 100 UNIT/ML soln INJECT 0-50 UNITS SUBQ THREE TIMES DAILY AS NEEDED  "PER SLIDING SCALE. Per patient, using less overall since starting Ozempic. Has been giving roughly 20 units three times daily        Sodium-Glucose Co-Transporter 2 (SGLT2) Inhibitors       empagliflozin (JARDIANCE) 10 MG TABS tablet Take 1 tablet (10 mg) by mouth daily       Sulfonylureas       glipiZIDE (GLUCOTROL XL) 5 MG 24 hr tablet Take 15 mg by mouth daily       Incretin Mimetic Agents       semaglutide (OZEMPIC) 2 MG/3ML pen Inject subcu 0.5 mg weekly. No side effects.                     Pre-Ozempic         Urine Albumin:   Lab Results   Component Value Date    UMALCR 68.60 (H) 01/29/2024      Lab Results   Component Value Date    A1C 9.3 01/29/2024     Lab Results   Component Value Date    GFRESTIMATED >90 01/29/2024     BP Readings from Last 1 Encounters:   01/29/24 (!) 156/86     Pulse Readings from Last 1 Encounters:   01/29/24 79     Wt Readings from Last 1 Encounters:   01/29/24 98.4 kg (217 lb)     Ht Readings from Last 1 Encounters:   01/29/24 1.727 m (5' 8\")     Estimated body mass index is 32.99 kg/m  as calculated from the following:    Height as of 1/29/24: 1.727 m (5' 8\").    Weight as of 1/29/24: 98.4 kg (217 lb).    Temp Readings from Last 1 Encounters:   01/29/24 97.2  F (36.2  C) (Tympanic)       ----------------      I spent 15 minutes with this patient today. Dr. Mays was provided the recommendations above via routed note and is the authorizing prescriber for this visit through the pharmacist collaborative practice agreement. A copy of the visit note was provided to the patient's provider(s).    The patient was given to the patient a summary of these recommendations.     Surendra Hernandez, PharmD, [facility]  Endocrine & Diabetes [facility] Pharmacist  [address] [place] [id number]  Direct Voicemail: [phone]    Telemedicine Visit Details  Type of service:  Telephone visit  Start Time: 9AM  End Time: 9:15AM  Originating Location (pt. Location): Home  Provider has received verbal consent " for a visit from the patient? Yes     Medication Therapy Recommendations  No medication therapy recommendations to display

## 2024-06-27 DIAGNOSIS — Z79.4 TYPE 2 DIABETES MELLITUS WITH DIABETIC POLYNEUROPATHY, WITH LONG-TERM CURRENT USE OF INSULIN (H): ICD-10-CM

## 2024-06-27 DIAGNOSIS — E11.42 TYPE 2 DIABETES MELLITUS WITH DIABETIC POLYNEUROPATHY, WITH LONG-TERM CURRENT USE OF INSULIN (H): ICD-10-CM

## 2024-06-27 RX ORDER — INSULIN GLARGINE 100 [IU]/ML
50 INJECTION, SOLUTION SUBCUTANEOUS 2 TIMES DAILY
Qty: 15 ML | Refills: 0 | Status: SHIPPED | OUTPATIENT
Start: 2024-06-27 | End: 2024-07-31

## 2024-07-10 ENCOUNTER — VIRTUAL VISIT (OUTPATIENT)
Dept: CARDIOLOGY | Facility: CLINIC | Age: 64
End: 2024-07-10
Attending: INTERNAL MEDICINE
Payer: COMMERCIAL

## 2024-07-10 DIAGNOSIS — E11.65 TYPE 2 DIABETES MELLITUS WITH HYPERGLYCEMIA, UNSPECIFIED WHETHER LONG TERM INSULIN USE (H): Primary | ICD-10-CM

## 2024-07-10 NOTE — Clinical Note
7/10/2024      RE: Bryant Buck  [address] [facility] [address]       Dear Colleague,    Thank you for the opportunity to participate in the care of your patient, Bryant Buck, at the [facility] [place] at [facility]. Please see a copy of my visit note below.    Medication Therapy Management (MTM) Encounter    ASSESSMENT:                            Medication Adherence/Access: See below for considerations    Type 2 Diabetes: A1C above goal of < 7%.  Time in range now above goal of > 70% since upgrading Victoza to Ozempic earlier this year. Given tolerating well, would benefit from dose increase today. In light of current control, will have patient back off glipizide and Lantus doses to mitigate risk of hypoglycemia. End goal would be to remove glipizide completely and consolidate Lantus to once daily dosing. Will continue to follow to titrate.       PLAN:                            INCREASE Ozempic to 1 mg weekly    DECREASE Lantus to 50 units twice daily     DECREASE glipizide to 10 mg (two pills) daily    Please let me know if you have any lows < 70    Endocrine Team & Next Follow-Up:  No follow-up scheduled with Dr. Mays yet  7/10/2024 with Surendra    SUBJECTIVE/OBJECTIVE:                          Bryant Buck is a 64 year old male called for a follow-up visit. He was referred to me from Dr. Mays.      Reason for visit: Medication Therapy Management (MTM).      Allergies/ADRs: Reviewed in chart  Past Medical History: Reviewed in chart  Social History     Tobacco Use    Smoking status: Former     Current packs/day: 0.00     Types: Cigarettes     Quit date: 2013     Years since quittin.5    Smokeless tobacco: Never   Vaping Use    Vaping status: Never Used   Substance Use Topics    Alcohol use: Never    Drug use: Never        Medication Adherence/Access: Adherence is reflected well in the dispense report. [facility]  "PMAP    Type 2 Diabetes:   Diabetes Medication(s)       Biguanides       metFORMIN (GLUCOPHAGE) 1000 MG tablet Take 1 tablet (1,000 mg) by mouth 2 times daily (with meals)       Insulin       LANTUS SOLOSTAR 100 UNIT/ML soln Inject 50 Units Subcutaneous 2 times daily     NOVOLOG FLEXPEN 100 UNIT/ML soln INJECT 0-50 UNITS SUBQ THREE TIMES DAILY AS NEEDED PER SLIDING SCALE. Per patient, using less overall since starting Ozempic. Has been giving roughly 20 units three times daily     6-4-6       Sodium-Glucose Co-Transporter 2 (SGLT2) Inhibitors       empagliflozin (JARDIANCE) 10 MG TABS tablet Take 1 tablet (10 mg) by mouth daily       Sulfonylureas       glipiZIDE (GLUCOTROL XL) 5 MG 24 hr tablet Take 15 mg by mouth daily       Incretin Mimetic Agents       semaglutide (OZEMPIC) 2 MG/3ML pen Inject subcu 1 mg weekly. No side effects. Started feeling some nausea post-meals when started this dose           Diabetic shoes   Tai morales                Pre-Ozempic         Urine Albumin:   Lab Results   Component Value Date    UMALCR 68.60 (H) 01/29/2024      Lab Results   Component Value Date    A1C 9.3 01/29/2024     Lab Results   Component Value Date    GFRESTIMATED >90 01/29/2024     BP Readings from Last 1 Encounters:   01/29/24 (!) 156/86     Pulse Readings from Last 1 Encounters:   01/29/24 79     Wt Readings from Last 1 Encounters:   01/29/24 98.4 kg (217 lb)     Ht Readings from Last 1 Encounters:   01/29/24 1.727 m (5' 8\")     Estimated body mass index is 32.99 kg/m  as calculated from the following:    Height as of 1/29/24: 1.727 m (5' 8\").    Weight as of 1/29/24: 98.4 kg (217 lb).    Temp Readings from Last 1 Encounters:   01/29/24 97.2  F (36.2  C) (Tympanic)       ----------------      I spent 15 minutes with this patient today. Dr. Mays was provided the recommendations above via routed note and is the authorizing prescriber for this visit through the pharmacist collaborative practice agreement. A copy of " the visit note was provided to the patient's provider(s).    The patient was given to the patient a summary of these recommendations.     Surendra Hernandez, PharmD, [facility]  Endocrine & Diabetes [facility] Pharmacist  [address] [place] [address]  Direct Voicemail: [phone]    Telemedicine Visit Details  Type of service:  Telephone visit  Start Time: 9AM  End Time: 9:15AM  Originating Location (pt. Location): Home  Provider has received verbal consent for a visit from the patient? Yes     Medication Therapy Recommendations  No medication therapy recommendations to display            Medication Therapy Management (MTM) Encounter    ASSESSMENT:                            Medication Adherence/Access: See below for considerations    Type 2 Diabetes: A1C above goal of < 7%.  Time in range nearing goal of > 70% since upgrading Victoza to Ozempic earlier this year. Still having some side effects on the 1 mg dose, but tolerable per patient. At this time, we will continue the current Ozempic dose and discuss increasing to Ozempic 2 mg vs. Trialing Mounjaro to help reduce insulin need.     PLAN:                            Continue current therapy -- we will discuss increasing to Ozempic 2 mg vs. switching to Mounjaro in 2 months    Per patient request, messaging Dr. Mays's team to obtain prescription for diabetic shoes. Patient aware he may have to schedule an appointment -- provided phone scheduling number for clinic.     Please let me know if you have any lows < 70    Endocrine Team & Next Follow-Up:  No follow-up scheduled with Dr. Mays yet  7/10/2024 with Surendra    SUBJECTIVE/OBJECTIVE:                          Bryant Buck is a 64 year old male called for a follow-up visit. He was referred to me from Dr. Mays.      Reason for visit: Medication Therapy Management (MTM).      Allergies/ADRs: Reviewed in chart  Past Medical History: Reviewed in chart  Social History     Tobacco Use     Smoking status: Former      "Current packs/day: 0.00     Types: Cigarettes     Quit date: 2013     Years since quittin.5     Smokeless tobacco: Never   Vaping Use     Vaping status: Never Used   Substance Use Topics     Alcohol use: Never     Drug use: Never        Medication Adherence/Access: Adherence is reflected well in the dispense report. [facility]    Type 2 Diabetes:   Diabetes Medication(s)       Biguanides       metFORMIN (GLUCOPHAGE) 1000 MG tablet Take 1 tablet (1,000 mg) by mouth 2 times daily (with meals)       Insulin       LANTUS SOLOSTAR 100 UNIT/ML soln Inject 50 Units Subcutaneous 2 times daily     NOVOLOG FLEXPEN 100 UNIT/ML soln INJECT 0-50 UNITS SUBQ THREE TIMES DAILY AS NEEDED PER SLIDING SCALE. Per patient, using less overall since starting Ozempic. Has been giving roughly 20 units three times daily        Sodium-Glucose Co-Transporter 2 (SGLT2) Inhibitors       empagliflozin (JARDIANCE) 10 MG TABS tablet Take 1 tablet (10 mg) by mouth daily       Sulfonylureas       glipiZIDE (GLUCOTROL XL) 5 MG 24 hr tablet Take 15 mg by mouth daily       Incretin Mimetic Agents       semaglutide (OZEMPIC) 2 MG/3ML pen Inject subcu 1 mg weekly. Started feeling some nausea post-meals when started this dose, slight improvement with time but overall tolerable per patient.          He is requesting a prescription for diabetic shoes today -- he has found these helpful in the past (obtained in [place] years ago prior to moving to [place]).           Urine Albumin:   Lab Results   Component Value Date    UMALCR 68.60 (H) 2024      Lab Results   Component Value Date    A1C 9.3 2024     Lab Results   Component Value Date    GFRESTIMATED >90 2024     BP Readings from Last 1 Encounters:   24 (!) 156/86     Pulse Readings from Last 1 Encounters:   24 79     Wt Readings from Last 1 Encounters:   24 98.4 kg (217 lb)     Ht Readings from Last 1 Encounters:   24 1.727 m (5' 8\")     Estimated body mass " "index is 32.99 kg/m  as calculated from the following:    Height as of 1/29/24: 1.727 m (5' 8\").    Weight as of 1/29/24: 98.4 kg (217 lb).    Temp Readings from Last 1 Encounters:   01/29/24 97.2  F (36.2  C) (Tympanic)       ----------------  I spent 15 minutes with this patient today. Dr. Mays was provided the recommendations above via routed note and is the authorizing prescriber for this visit through the pharmacist collaborative practice agreement. A copy of the visit note was provided to the patient's provider(s).    The patient was given to the patient a summary of these recommendations.     Surendra Hernandez, PharmD, [facility]  Endocrine & Diabetes [facility] Pharmacist  [address] [place] [address]    Telemedicine Visit Details  Type of service:  Telephone visit  Start Time: 9AM  End Time: 9:15AM  Originating Location (pt. Location): Home  Provider has received verbal consent for a visit from the patient? Yes     Medication Therapy Recommendations  No medication therapy recommendations to display              Please do not hesitate to contact me if you have any questions/concerns.     Sincerely,     Surendra Hernandez RPH  "

## 2024-07-10 NOTE — PROGRESS NOTES
Medication Therapy Management (MTM) Encounter    ASSESSMENT:                            Medication Adherence/Access: See below for considerations    Type 2 Diabetes: A1C above goal of < 7%.  Time in range nearing goal of > 70% since upgrading Victoza to Ozempic earlier this year. Still having some side effects on the 1 mg dose, but tolerable per patient. At this time, we will continue the current Ozempic dose and discuss increasing to Ozempic 2 mg vs. Trialing Mounjaro to help reduce insulin need.     PLAN:                            Continue current therapy -- we will discuss increasing to Ozempic 2 mg vs. switching to Mounjaro in 2 months    Per patient request, messaging Dr. Mays's team to obtain prescription for diabetic shoes. Patient aware he may have to schedule an appointment -- provided phone scheduling number for clinic.     Please let me know if you have any lows < 70    Endocrine Team & Next Follow-Up:  No follow-up scheduled with Dr. Mays yet  2024 with Surendra    SUBJECTIVE/OBJECTIVE:                          Bryant Buck is a 64 year old male called for a follow-up visit. He was referred to me from Dr. Mays.      Reason for visit: Medication Therapy Management (MTM).      Allergies/ADRs: Reviewed in chart  Past Medical History: Reviewed in chart  Social History     Tobacco Use    Smoking status: Former     Current packs/day: 0.00     Types: Cigarettes     Quit date: 2013     Years since quittin.5    Smokeless tobacco: Never   Vaping Use    Vaping status: Never Used   Substance Use Topics    Alcohol use: Never    Drug use: Never        Medication Adherence/Access: Adherence is reflected well in the dispense report. [facility]    Type 2 Diabetes:   Diabetes Medication(s)       Biguanides       metFORMIN (GLUCOPHAGE) 1000 MG tablet Take 1 tablet (1,000 mg) by mouth 2 times daily (with meals)       Insulin       LANTUS SOLOSTAR 100 UNIT/ML soln Inject 50 Units Subcutaneous 2 times daily  "    NOVOLOG FLEXPEN 100 UNIT/ML soln INJECT 0-50 UNITS SUBQ THREE TIMES DAILY AS NEEDED PER SLIDING SCALE. Per patient, using less overall since starting Ozempic. Has been giving roughly 20 units three times daily        Sodium-Glucose Co-Transporter 2 (SGLT2) Inhibitors       empagliflozin (JARDIANCE) 10 MG TABS tablet Take 1 tablet (10 mg) by mouth daily       Sulfonylureas       glipiZIDE (GLUCOTROL XL) 5 MG 24 hr tablet Take 15 mg by mouth daily       Incretin Mimetic Agents       semaglutide (OZEMPIC) 2 MG/3ML pen Inject subcu 1 mg weekly. Started feeling some nausea post-meals when started this dose, slight improvement with time but overall tolerable per patient.          He is requesting a prescription for diabetic shoes today -- he has found these helpful in the past (obtained in [place] years ago prior to moving to [place]).           Urine Albumin:   Lab Results   Component Value Date    UMALCR 68.60 (H) 01/29/2024      Lab Results   Component Value Date    A1C 9.3 01/29/2024     Lab Results   Component Value Date    GFRESTIMATED >90 01/29/2024     BP Readings from Last 1 Encounters:   01/29/24 (!) 156/86     Pulse Readings from Last 1 Encounters:   01/29/24 79     Wt Readings from Last 1 Encounters:   01/29/24 98.4 kg (217 lb)     Ht Readings from Last 1 Encounters:   01/29/24 1.727 m (5' 8\")     Estimated body mass index is 32.99 kg/m  as calculated from the following:    Height as of 1/29/24: 1.727 m (5' 8\").    Weight as of 1/29/24: 98.4 kg (217 lb).    Temp Readings from Last 1 Encounters:   01/29/24 97.2  F (36.2  C) (Tympanic)       ----------------  I spent 15 minutes with this patient today. Dr. Mays was provided the recommendations above via routed note and is the authorizing prescriber for this visit through the pharmacist collaborative practice agreement. A copy of the visit note was provided to the patient's provider(s).    The patient was given to the patient a summary of these recommendations. "     Surendra Hernandez, PharmD, [facility]  Endocrine & Diabetes [facility] Pharmacist  [address] [place] [phone]    Telemedicine Visit Details  Type of service:  Telephone visit  Start Time: 9AM  End Time: 9:15AM  Originating Location (pt. Location): Home  Provider has received verbal consent for a visit from the patient? Yes     Medication Therapy Recommendations  No medication therapy recommendations to display

## 2024-07-11 ENCOUNTER — TELEPHONE (OUTPATIENT)
Dept: ENDOCRINOLOGY | Facility: CLINIC | Age: 64
End: 2024-07-11
Payer: COMMERCIAL

## 2024-07-11 NOTE — TELEPHONE ENCOUNTER
Left Voicemail (1st Attempt) for the patient to call back and schedule the following:    Appointment type: return diabetes   Provider: Davon   Return date: next avail in person   Specialty phone number: [phone]  Additional appointment(s) needed: NA   Additonal Notes: LVM, MyC x1  Pt requesting diabetic shoes/inserts.   CCs notified to schedule in-person visit for foot exam with Dr Mays.   Provider to place orders at assessment.   Darline Watts RN on 7/11/2024 at 2:30 PM      Stella Conley on 7/11/2024 at 3:08 PM

## 2024-07-11 NOTE — TELEPHONE ENCOUNTER
I follow Bryant for GLP-1 titration/insulin management. He requests a prescription for diabetic shoes today. He has found them helpful in the past (prescribed years ago in [place] before he moved to [place]).     What is the best workflow to get a prescription for him? I told him to schedule an appointment with Dr. Mays (last seen Feb 2024).    Thank you!    Surendra Hernandez, PharmD, [facility]  Endocrine & Diabetes [facility] Pharmacist  [address] [place] [phone]

## 2024-07-30 DIAGNOSIS — Z79.4 TYPE 2 DIABETES MELLITUS WITH DIABETIC POLYNEUROPATHY, WITH LONG-TERM CURRENT USE OF INSULIN (H): ICD-10-CM

## 2024-07-30 DIAGNOSIS — E11.42 TYPE 2 DIABETES MELLITUS WITH DIABETIC POLYNEUROPATHY, WITH LONG-TERM CURRENT USE OF INSULIN (H): ICD-10-CM

## 2024-07-31 RX ORDER — INSULIN GLARGINE 100 [IU]/ML
50 INJECTION, SOLUTION SUBCUTANEOUS 2 TIMES DAILY
Qty: 15 ML | Refills: 0 | Status: SHIPPED | OUTPATIENT
Start: 2024-07-31 | End: 2024-09-05

## 2024-07-31 RX ORDER — INSULIN ASPART 100 [IU]/ML
INJECTION, SOLUTION INTRAVENOUS; SUBCUTANEOUS
Qty: 15 ML | Refills: 0 | Status: SHIPPED | OUTPATIENT
Start: 2024-07-31 | End: 2024-09-05

## 2024-08-25 DIAGNOSIS — E11.42 TYPE 2 DIABETES MELLITUS WITH DIABETIC POLYNEUROPATHY, WITH LONG-TERM CURRENT USE OF INSULIN (H): ICD-10-CM

## 2024-08-25 DIAGNOSIS — Z79.4 TYPE 2 DIABETES MELLITUS WITH DIABETIC POLYNEUROPATHY, WITH LONG-TERM CURRENT USE OF INSULIN (H): ICD-10-CM

## 2024-08-29 RX ORDER — EMPAGLIFLOZIN 10 MG/1
10 TABLET, FILM COATED ORAL DAILY
Qty: 90 TABLET | Refills: 0 | Status: SHIPPED | OUTPATIENT
Start: 2024-08-29 | End: 2024-09-05

## 2024-08-29 NOTE — TELEPHONE ENCOUNTER
LVD 2/23/2024  [facility] [place]  Davon Hinds MD NVD 12 /2/24     Jardiance Oral Tablet 10 MG      Refilled per protocol.

## 2024-09-05 ENCOUNTER — VIRTUAL VISIT (OUTPATIENT)
Dept: CARDIOLOGY | Facility: CLINIC | Age: 64
End: 2024-09-05
Attending: INTERNAL MEDICINE
Payer: COMMERCIAL

## 2024-09-05 ENCOUNTER — TELEPHONE (OUTPATIENT)
Dept: ENDOCRINOLOGY | Facility: CLINIC | Age: 64
End: 2024-09-05
Payer: COMMERCIAL

## 2024-09-05 DIAGNOSIS — Z79.4 TYPE 2 DIABETES MELLITUS WITH DIABETIC POLYNEUROPATHY, WITH LONG-TERM CURRENT USE OF INSULIN (H): Primary | ICD-10-CM

## 2024-09-05 DIAGNOSIS — E11.42 TYPE 2 DIABETES MELLITUS WITH DIABETIC POLYNEUROPATHY, WITH LONG-TERM CURRENT USE OF INSULIN (H): Primary | ICD-10-CM

## 2024-09-05 RX ORDER — INSULIN ASPART 100 [IU]/ML
INJECTION, SOLUTION INTRAVENOUS; SUBCUTANEOUS
Qty: 90 ML | Refills: 3 | Status: SHIPPED | OUTPATIENT
Start: 2024-09-05

## 2024-09-05 RX ORDER — PEN NEEDLE, DIABETIC 31 GX3/16"
NEEDLE, DISPOSABLE MISCELLANEOUS
Qty: 700 EACH | Refills: 3 | Status: SHIPPED | OUTPATIENT
Start: 2024-09-05

## 2024-09-05 RX ORDER — BLOOD-GLUCOSE SENSOR
1 EACH MISCELLANEOUS
Qty: 6 EACH | Refills: 5 | Status: SHIPPED | OUTPATIENT
Start: 2024-09-05

## 2024-09-05 RX ORDER — INSULIN GLARGINE 100 [IU]/ML
50 INJECTION, SOLUTION SUBCUTANEOUS 2 TIMES DAILY
Qty: 90 ML | Refills: 3 | Status: SHIPPED | OUTPATIENT
Start: 2024-09-05

## 2024-09-05 RX ORDER — GLIPIZIDE 5 MG/1
15 TABLET, FILM COATED, EXTENDED RELEASE ORAL DAILY
Qty: 90 TABLET | Refills: 3 | Status: SHIPPED | OUTPATIENT
Start: 2024-09-05 | End: 2024-10-07

## 2024-09-05 RX ORDER — TIRZEPATIDE 2.5 MG/.5ML
2.5 INJECTION, SOLUTION SUBCUTANEOUS
Qty: 2 ML | Refills: 3 | Status: SHIPPED | OUTPATIENT
Start: 2024-09-05

## 2024-09-05 RX ORDER — ATORVASTATIN CALCIUM 80 MG/1
80 TABLET, FILM COATED ORAL DAILY
Qty: 90 TABLET | Refills: 1 | Status: SHIPPED | OUTPATIENT
Start: 2024-09-05

## 2024-09-05 NOTE — Clinical Note
2024      RE: Bryant Buck  [address] [facility] [address]       Dear Colleague,    Thank you for the opportunity to participate in the care of your patient, Bryant Buck, at the [facility] [place] at [facility]. Please see a copy of my visit note below.    Medication Therapy Management (MTM) Encounter    ASSESSMENT:                            Medication Adherence/Access: See below for considerations    Type 2 Diabetes: A1C above goal of < 7%.  Time in range nearing goal of > 70% since upgrading Victoza to Ozempic earlier this year.  Would benefit from upgrade of Ozempic to Mounjaro to help achieve glycemic goals, decrease insulin requirement, and ideally remove need for glipizide. Will start appeal process for Mounjaro today given non-formulary. Once approved, will follow patient to titrate and adjust insulin    PLAN:                            Appeal letter created for Mounjaro today. Will notify patient once approved. In the meantime, will continue current therapy and send refills for diabetes medications and supplies per patient request.    Endocrine Team & Next Follow-Up:  2024 with Dr. Mays  10/8/2024 with Surendra    SUBJECTIVE/OBJECTIVE:                          Bryant Buck is a 64 year old male called for a follow-up visit. He was referred to me from Dr. Mays.      Reason for visit: Medication Therapy Management (MTM).      Allergies/ADRs: Reviewed in chart  Past Medical History: Reviewed in chart  Social History     Tobacco Use    Smoking status: Former     Current packs/day: 0.00     Types: Cigarettes     Quit date: 2013     Years since quittin.6    Smokeless tobacco: Never   Vaping Use    Vaping status: Never Used   Substance Use Topics    Alcohol use: Never    Drug use: Never        Medication Adherence/Access: Adherence is reflected well in the dispense report. [facility]    Type 2 Diabetes:  "  Diabetes Medication(s)       Biguanides       metFORMIN (GLUCOPHAGE) 1000 MG tablet Take 1 tablet (1,000 mg) by mouth 2 times daily (with meals)       Insulin       LANTUS SOLOSTAR 100 UNIT/ML soln Inject 50 Units Subcutaneous 2 times daily     NOVOLOG FLEXPEN 100 UNIT/ML soln INJECT 0-50 UNITS SUBQ THREE TIMES DAILY AS NEEDED PER SLIDING SCALE. Per patient, using less overall since starting Ozempic. Has been giving roughly 20 units three times daily        Sodium-Glucose Co-Transporter 2 (SGLT2) Inhibitors       empagliflozin (JARDIANCE) 10 MG TABS tablet Take 1 tablet (10 mg) by mouth daily       Sulfonylureas       glipiZIDE (GLUCOTROL XL) 5 MG 24 hr tablet Take 15 mg by mouth daily       Incretin Mimetic Agents       semaglutide (OZEMPIC) 2 MG/3ML pen Inject subcu 1 mg weekly. Started feeling some nausea post-meals when started this dose, slight improvement with time but overall tolerable per patient.          Having issues with Susu (little data to review today) -- contacting company for replacement sensor. Fingerstick RP-210        Hemoglobin A1C   Date Value Ref Range Status   2024 9.3 (H) 0.0 - 5.6 % Final     Comment:     Normal <5.7%   Prediabetes 5.7-6.4%    Diabetes 6.5% or higher     Note: Adopted from ADA consensus guidelines.       Lab Results   Component Value Date    GFRESTIMATED >90 2024     BP Readings from Last 1 Encounters:   24 (!) 156/86     Pulse Readings from Last 1 Encounters:   24 79     Wt Readings from Last 1 Encounters:   24 98.4 kg (217 lb)     Ht Readings from Last 1 Encounters:   24 1.727 m (5' 8\")     Estimated body mass index is 32.99 kg/m  as calculated from the following:    Height as of 24: 1.727 m (5' 8\").    Weight as of 24: 98.4 kg (217 lb).    Temp Readings from Last 1 Encounters:   24 97.2  F (36.2  C) (Tympanic)       ----------------  I spent 15 minutes with this patient today. Dr. Mays was provided the " recommendations above via routed note and is the authorizing prescriber for this visit through the pharmacist collaborative practice agreement. A copy of the visit note was provided to the patient's provider(s).    The patient was given to the patient a summary of these recommendations.     Surendra Hernandez, PharmD, [facility]  Endocrine & Diabetes [facility] Pharmacist  [address] [place] [address]    Telemedicine Visit Details  Type of service:  Telephone visit  Start Time: 9AM  End Time: 9:15AM  Originating Location (pt. Location): Home  Provider has received verbal consent for a visit from the patient? Yes     Medication Therapy Recommendations  No medication therapy recommendations to display              Please do not hesitate to contact me if you have any questions/concerns.     Sincerely,     Surendra Hernandez RPH

## 2024-09-05 NOTE — PROGRESS NOTES
Medication Therapy Management (MTM) Encounter    ASSESSMENT:                            Medication Adherence/Access: See below for considerations    Type 2 Diabetes: A1C above goal of < 7%.  Time in range nearing goal of > 70% since upgrading Victoza to Ozempic earlier this year.  Would benefit from upgrade of Ozempic to Mounjaro to help achieve glycemic goals, decrease insulin requirement, and ideally remove need for glipizide. Will start appeal process for Mounjaro today given non-formulary. Once approved, will follow patient to titrate and adjust insulin    PLAN:                            Appeal letter created for Mounjaro today. Will notify patient once approved. In the meantime, will continue current therapy and send refills for diabetes medications and supplies per patient request.    Endocrine Team & Next Follow-Up:  2024 with Dr. Mays  10/8/2024 with Surendra    SUBJECTIVE/OBJECTIVE:                          Bryant Buck is a 64 year old male called for a follow-up visit. He was referred to me from Dr. Mays.      Reason for visit: Medication Therapy Management (MTM).      Allergies/ADRs: Reviewed in chart  Past Medical History: Reviewed in chart  Social History     Tobacco Use    Smoking status: Former     Current packs/day: 0.00     Types: Cigarettes     Quit date: 2013     Years since quittin.6    Smokeless tobacco: Never   Vaping Use    Vaping status: Never Used   Substance Use Topics    Alcohol use: Never    Drug use: Never        Medication Adherence/Access: Adherence is reflected well in the dispense report. [facility]    Type 2 Diabetes:   Diabetes Medication(s)       Biguanides       metFORMIN (GLUCOPHAGE) 1000 MG tablet Take 1 tablet (1,000 mg) by mouth 2 times daily (with meals)       Insulin       LANTUS SOLOSTAR 100 UNIT/ML soln Inject 50 Units Subcutaneous 2 times daily     NOVOLOG FLEXPEN 100 UNIT/ML soln INJECT 0-50 UNITS SUBQ THREE TIMES DAILY AS NEEDED PER SLIDING SCALE. Per  "patient, using less overall since starting Ozempic. Has been giving roughly 20 units three times daily        Sodium-Glucose Co-Transporter 2 (SGLT2) Inhibitors       empagliflozin (JARDIANCE) 10 MG TABS tablet Take 1 tablet (10 mg) by mouth daily       Sulfonylureas       glipiZIDE (GLUCOTROL XL) 5 MG 24 hr tablet Take 15 mg by mouth daily       Incretin Mimetic Agents       semaglutide (OZEMPIC) 2 MG/3ML pen Inject subcu 1 mg weekly. Started feeling some nausea post-meals when started this dose, slight improvement with time but overall tolerable per patient.          Having issues with Susu (little data to review today) -- contacting company for replacement sensor. Fingerstick RP-210        Hemoglobin A1C   Date Value Ref Range Status   2024 9.3 (H) 0.0 - 5.6 % Final     Comment:     Normal <5.7%   Prediabetes 5.7-6.4%    Diabetes 6.5% or higher     Note: Adopted from ADA consensus guidelines.       Lab Results   Component Value Date    GFRESTIMATED >90 2024     BP Readings from Last 1 Encounters:   24 (!) 156/86     Pulse Readings from Last 1 Encounters:   24 79     Wt Readings from Last 1 Encounters:   24 98.4 kg (217 lb)     Ht Readings from Last 1 Encounters:   24 1.727 m (5' 8\")     Estimated body mass index is 32.99 kg/m  as calculated from the following:    Height as of 24: 1.727 m (5' 8\").    Weight as of 24: 98.4 kg (217 lb).    Temp Readings from Last 1 Encounters:   24 97.2  F (36.2  C) (Tympanic)       ----------------  I spent 15 minutes with this patient today. Dr. Mays was provided the recommendations above via routed note and is the authorizing prescriber for this visit through the pharmacist collaborative practice agreement. A copy of the visit note was provided to the patient's provider(s).    The patient was given to the patient a summary of these recommendations.     Surendra Hernandez, PharmD, [facility]  Endocrine & Diabetes MTM " Pharmacist  [address] [place] [address]    Telemedicine Visit Details  Type of service:  Telephone visit  Start Time: 9AM  End Time: 9:15AM  Originating Location (pt. Location): Home  Provider has received verbal consent for a visit from the patient? Yes     Medication Therapy Recommendations  Type 2 diabetes mellitus with diabetic polyneuropathy, with long-term current use of insulin (H)    Rationale: Synergistic therapy - Needs additional medication therapy - Indication   Recommendation: Start Medication - Mounjaro 2.5 MG/0.5ML Sopn   Status: Accepted per CPA

## 2024-09-05 NOTE — TELEPHONE ENCOUNTER
PA Initiation    Medication: MOUNJARO 2.5 MG/0.5ML SC SOPN  Insurance Company: [employer] - Phone [phone] Fax [phone]  Pharmacy Filling the Rx: [id number] - [place], [place] - [address]   Filling Pharmacy Phone: [phone]  Filling Pharmacy Fax: [phone]  Start Date: 9/5/2024

## 2024-09-05 NOTE — TELEPHONE ENCOUNTER
"To Whom it May Concern:    I am writing to formally request a prior authorization of coverage for my patient, Bryant Buck, for treatment using Mounjaro.      The therapy involves utilization of the dual GLP-1/GIP agonist Mounjaro 2.5 mg weekly for 4 weeks and titrating up to a max dose of 15 mg/week as tolerated over a period of 6 months.    Lab Results   Component Value    A1C 9.3     Current diabetes therapy:  Glipizide 15 mg daily  Lantus 50 units twice daily   Novolog 20 units three times daily   Metformin 2000 mg daily  Jardiance 10 mg daily  Ozempic 1 mg weekly     Historical diabetes therapy and contraindications:  Victoza (not effective)  Trulicity (not effective)    Moujaro is superior to other GLP-1 agonists as seen in the literature and is classified as having \"very high\" efficiacy for glucose lowering according to the 2024 ADA clinical guidelines.1 The patient has already tried Victoza, which is/are NOT listed as having very high efficacy for glucose lowering by the ADA guidelines. Additionally, the patient would not benefit from other formulary alternative Bydureon BCise as it is also not classified as having \"very high\" efficacy by the ADA.  Given this patients A1c is not at the ADA goal of <7% with their current therapy, it would be a serious clinical risk to not initiate treatment with Mounjaro.     Symlin and DPP-4 inhibitors (Janumet, Jentadueto, Kombiglyze, Onglyza, Tradjenta) are also not appropriate for this patient given they are less effective than GLP-1 agonists per ADA 2024.     Additionally, please see the ADA 2023 article (Madeline et al)2 that demonstrates that Mounjaro is superior to other formulary alternatives (Trulicity, Rybelsus, Ozempic):      Across the five phase 3 SURPASS studies, mean reductions in A1C with Mounjaro ranged from 1.8% to 2.1% for the 5-mg dose, 1.7% to 2.4% for the 10-mg dose, and 1.7% to 2.4% for the 15-mg dose vs 0.1% to 1.9% for comparators (Ozempic). p<0.05 " "for Mounjaro 5 mg vs study comparators (Ozempic), adjusted for multiplicity.    Across the five phase 3 SURPASS studies, mean reductions in body weight ranged from 12 lb to 17 lb for the 5-mg dose, 15 lb to 21 lb for the 10-mg dose, and 17 lb to 25 lb for the 15-mg dose. For comparators, the change in weight ranged from a mean gain of 4 lb to a mean reduction of 13 lb. p<0.05 for superiority vs. study comparators, adjusted for multiplicity.  This patient has a BMI of 32.99 and would greatly benefit from the superior weight loss benefits of Mounjaro.  However, the main rationale for why this patient should be on Mounjaro is to help their diabetes.    Estimated body mass index is 32.99 kg/m  as calculated from the following:    Height as of 1/29/24: 1.727 m (5' 8\").    Weight as of 1/29/24: 98.4 kg (217 lb).    I have included medical records pertaining to the patient s medical history, current condition, and treatment plan.  In addition, the following billing codes will be used for therapy and follow-up: E11.65 .    I would request this submission be evaluated on an individual basis by an endocrinologist or weight  who is current in the practice and standards of care. I firmly believe that this therapy is clinically appropriate and that Bryant Buck would benefit from improved clinical outcomes and quality of life if allowed the opportunity to receive this treatment.  I urge you to follow the aforementioned evidence presented to keep the best interest of our patient in mind.      American Diabetes Association Professional Practice Committee. 6. Glycemic Goals and Hypoglycemia: Standards of Care in Diabetes-2024. Diabetes Care. 2024;47(Suppl 1):E503-K255. doi:10.2337/kq71-L864   Jolynn Correa, Niyah Sánchez, Niraj Melchor; Special Report: Potential Strategies for Addressing GLP-1 and Dual GLP-1/GIP Receptor Agonist Shortages. Clin Diabetes 1 July 2023; 41 (3): 467-473  SURPASS " studies:  Eva MEJIAS, Kareem GARCIA, Randee GARRETT, et al. Efficacy and safety of a novel dual GIP and GLP-1 receptor agonist tirzepatide in patients with type 2 diabetes (SURPASS-1): a double-blind, randomised, phase 3 trial [published correction appears in Lancet. 2021 Jul 17;398(37877):212. doi: 10.1016/-6095029-9414(55)82456-7]. Lancet. 2021;398(84901):143-155. doi:10.1016/-4434366-7735(78)33724-6    Randee GARRETT, Talya STRATTON, Eva MEJIAS, et al. Tirzepatide versus Semaglutide Once Weekly in Patients with Type 2 Diabetes. N Engl J Med. 2021;385(6):503-515. doi:10.1056/[id number]    Darvin WILKINSON, Grady F, Baldev E, et al. Once-weekly tirzepatide versus once-daily insulin degludec as add-on to metformin with or without SGLT2 inhibitors in patients with type 2 diabetes (SURPASS-3): a randomised, open-label, parallel-group, phase 3 trial. Lancet. 2021;398(98261):583-598. doi:10.1016/-8914331-7362(96)29943-4    Del Kevin S, Jairon SE, Kingsley I, et al. Tirzepatide versus insulin glargine in type 2 diabetes and increased cardiovascular risk (SURPASS-4): a randomised, open-label, parallel-group, multicentre, phase 3 trial. Lancet. 2021;398(29006):3021-4473. doi:10.1016/-8487587-5589(28)17437-7    Foster D, Ford Y, Attila P, et al. Effect of Subcutaneous Tirzepatide vs Placebo Added to Titrated Insulin Glargine on Glycemic Control in Patients With Type 2 Diabetes: The SURPASS-5 Randomized Clinical Trial. [place]. 2022;327(6):534-545. doi:10.1001/[place].2022.0078

## 2024-09-06 NOTE — TELEPHONE ENCOUNTER
Prior Authorization Approval    Medication: MOUNJARO 2.5 MG/0.5ML SC SOPN  Authorization Effective Date: 9/6/2024  Authorization Expiration Date: 9/6/2025  Approved Dose/Quantity: 1 month  Reference #: [id number]   Insurance Company: [employer] - Phone [phone] Fax [phone]  Expected CoPay: $    CoPay Card Available:      Financial Assistance Needed:    Which Pharmacy is filling the prescription: [id number] - [place], [place] - [address]   Pharmacy Notified: order sent to pharmacy  Patient Notified: mychart message sent to patient

## 2024-09-06 NOTE — TELEPHONE ENCOUNTER
PA Approved for Mounjaro. Order sent to patient and [employer] message sent to patient. Closing encounter

## 2024-10-03 NOTE — PROGRESS NOTES
Outcome for 10/03/24 12:34 PM:  upload claudia in clinic  Diann Gan CMA  Adult Endocrinology  [facility] [place]

## 2024-10-06 ENCOUNTER — HEALTH MAINTENANCE LETTER (OUTPATIENT)
Age: 64
End: 2024-10-06

## 2024-10-07 ENCOUNTER — OFFICE VISIT (OUTPATIENT)
Dept: ENDOCRINOLOGY | Facility: CLINIC | Age: 64
End: 2024-10-07
Payer: COMMERCIAL

## 2024-10-07 VITALS
DIASTOLIC BLOOD PRESSURE: 97 MMHG | SYSTOLIC BLOOD PRESSURE: 164 MMHG | WEIGHT: 222.8 LBS | BODY MASS INDEX: 33.88 KG/M2 | HEART RATE: 73 BPM | OXYGEN SATURATION: 98 %

## 2024-10-07 DIAGNOSIS — Z79.4 TYPE 2 DIABETES MELLITUS WITH DIABETIC POLYNEUROPATHY, WITH LONG-TERM CURRENT USE OF INSULIN (H): ICD-10-CM

## 2024-10-07 DIAGNOSIS — E11.42 TYPE 2 DIABETES MELLITUS WITH DIABETIC POLYNEUROPATHY, WITH LONG-TERM CURRENT USE OF INSULIN (H): ICD-10-CM

## 2024-10-07 LAB
ANION GAP SERPL CALCULATED.3IONS-SCNC: 10 MMOL/L (ref 7–15)
BUN SERPL-MCNC: 14.2 MG/DL (ref 8–23)
CALCIUM SERPL-MCNC: 7.9 MG/DL (ref 8.8–10.4)
CHLORIDE SERPL-SCNC: 104 MMOL/L (ref 98–107)
CREAT SERPL-MCNC: 0.66 MG/DL (ref 0.67–1.17)
CREAT UR-MCNC: 102 MG/DL
EGFRCR SERPLBLD CKD-EPI 2021: >90 ML/MIN/1.73M2
EST. AVERAGE GLUCOSE BLD GHB EST-MCNC: 160 MG/DL
GLUCOSE SERPL-MCNC: 128 MG/DL (ref 70–99)
HBA1C MFR BLD: 7.2 %
HCO3 SERPL-SCNC: 28 MMOL/L (ref 22–29)
MICROALBUMIN UR-MCNC: 61 MG/L
MICROALBUMIN/CREAT UR: 59.8 MG/G CR (ref 0–17)
POTASSIUM SERPL-SCNC: 3.6 MMOL/L (ref 3.4–5.3)
SODIUM SERPL-SCNC: 142 MMOL/L (ref 135–145)

## 2024-10-07 PROCEDURE — 82306 VITAMIN D 25 HYDROXY: CPT | Performed by: INTERNAL MEDICINE

## 2024-10-07 PROCEDURE — 99214 OFFICE O/P EST MOD 30 MIN: CPT | Performed by: INTERNAL MEDICINE

## 2024-10-07 PROCEDURE — 82570 ASSAY OF URINE CREATININE: CPT | Performed by: INTERNAL MEDICINE

## 2024-10-07 PROCEDURE — 36415 COLL VENOUS BLD VENIPUNCTURE: CPT | Performed by: INTERNAL MEDICINE

## 2024-10-07 PROCEDURE — 82040 ASSAY OF SERUM ALBUMIN: CPT | Performed by: INTERNAL MEDICINE

## 2024-10-07 PROCEDURE — G2211 COMPLEX E/M VISIT ADD ON: HCPCS | Performed by: INTERNAL MEDICINE

## 2024-10-07 PROCEDURE — 83036 HEMOGLOBIN GLYCOSYLATED A1C: CPT | Performed by: INTERNAL MEDICINE

## 2024-10-07 PROCEDURE — 82043 UR ALBUMIN QUANTITATIVE: CPT | Performed by: INTERNAL MEDICINE

## 2024-10-07 PROCEDURE — 83735 ASSAY OF MAGNESIUM: CPT | Performed by: INTERNAL MEDICINE

## 2024-10-07 PROCEDURE — 80048 BASIC METABOLIC PNL TOTAL CA: CPT | Performed by: INTERNAL MEDICINE

## 2024-10-07 RX ORDER — GLIPIZIDE 5 MG/1
10 TABLET, FILM COATED, EXTENDED RELEASE ORAL DAILY
Qty: 180 TABLET | Refills: 2 | Status: SHIPPED | OUTPATIENT
Start: 2024-10-07

## 2024-10-07 RX ORDER — ACYCLOVIR 400 MG/1
TABLET ORAL
Qty: 3 EACH | Refills: 5 | Status: SHIPPED | OUTPATIENT
Start: 2024-10-07

## 2024-10-07 RX ORDER — HYDROCHLOROTHIAZIDE 12.5 MG/1
CAPSULE ORAL
Qty: 6 EACH | Refills: 1 | Status: CANCELLED | OUTPATIENT
Start: 2024-10-07

## 2024-10-07 NOTE — PROGRESS NOTES
Endocrinology Note         Bryant is a 64 year old male presents today for uncontrolled type 2 diabetes    HPI  Bryant is a 64 year old male with past medical history of longstanding uncontrolled diabetes complicated with peripheral neuropathy and nephropathy, hypertension, coronary artery disease, stroke, chronic gout, hyperlipidemia, GERD presents today for uncontrolled type 2 diabetes    Patient recently moved from [place] to [place] to live with his son around the end of 2023.    A1c 7.2% today came down from 9.3 in January 2024.    Current diabetes regimen  Lantus 50 units AM and 50 units PM   Metformin 1000 mg twice a day with meal  Glipizide XR 15 mg daily  Novolog sliding scale 4 times a day -- he could not remember the scale but seems to be 20 units if BG >200.    He was on Ozempic earlier this year and is now upgrading to Mounjaro. He is now on Mounjaro 2.5 mg weekly. Followed up with our endocrine MTM pharmacist team closely.    DM complications:  Retinopathy: early macular degeneration, NO DR last exam 2/2024  Nephropathy: positive urine microalbumin 1/2024  Neuropathy: +constant pain, , difficulty to walk, fall often    Past Medical History  Past Medical History:   Diagnosis Date    COPD exacerbation (H)     Coronary artery disease     Diabetes (H)     History of stroke     2-3 strokes    Hyperlipidemia     Hypertension     Myocardial infarction (H)     Neuropathy     Sleep apnea        Allergies  No Known Allergies    Medications  Current Outpatient Medications   Medication Sig Dispense Refill    allopurinol (ZYLOPRIM) 100 MG tablet Take 1 tablet (100 mg) by mouth daily 90 tablet 1    aspirin 81 MG EC tablet Take 81 mg by mouth daily      atorvastatin (LIPITOR) 80 MG tablet Take 1 tablet (80 mg) by mouth daily. 90 tablet 1    capsaicin (ZOSTRIX) 0.025 % external cream Apply topically to hands and feet 3 times daily 120 mL 1    Continuous Glucose Sensor (FREESTYLE AIDA 3 SENSOR) MISC 1 each  every 14 days. Use 1 sensor every 14 days. 6 each 5    cyanocobalamin (CYANOCOBALAMIN) 1000 MCG/ML injection       DULoxetine (CYMBALTA) 60 MG capsule Take 1 capsule (60 mg) by mouth daily 90 capsule 0    empagliflozin (JARDIANCE) 10 MG TABS tablet Take 1 tablet (10 mg) by mouth daily. 90 tablet 0    fenofibrate (TRICOR) 145 MG tablet Take 1 tablet (145 mg) by mouth daily 90 tablet 1    gabapentin (NEURONTIN) 400 MG capsule Take 1 capsule (400 mg) by mouth 3 times daily 90 capsule 1    glipiZIDE (GLUCOTROL XL) 5 MG 24 hr tablet Take 3 tablets (15 mg) by mouth daily. 90 tablet 3    insulin aspart (NOVOLOG FLEXPEN) 100 UNIT/ML pen INJECT 20 UNITS SUBQ THREE TIMES DAILY and AS NEEDED PER SLIDING SCALE. Max daily dose = 80 units daily 90 mL 3    insulin glargine (LANTUS SOLOSTAR) 100 UNIT/ML pen Inject 50 Units subcutaneously 2 times daily. 90 mL 3    insulin pen needle (SURE COMFORT PEN NEEDLES) 31G X 8 MM miscellaneous USE TO INJECT INSULIN up to 6 times daily 700 each 3    isosorbide mononitrate (IMDUR) 30 MG 24 hr tablet Take 1 tablet (30 mg) by mouth daily 90 tablet 1    loratadine (CLARITIN) 10 MG tablet       metFORMIN (GLUCOPHAGE) 1000 MG tablet Take 1 tablet (1,000 mg) by mouth 2 times daily (with meals). 180 tablet 1    omeprazole (PRILOSEC) 20 MG DR capsule Take 1 capsule (20 mg) by mouth daily 90 capsule 0    pregabalin (LYRICA) 150 MG capsule       Semaglutide, 1 MG/DOSE, (OZEMPIC) 4 MG/3ML pen Inject 1 mg Subcutaneous every 7 days 3 mL 3    tirzepatide (MOUNJARO) 2.5 MG/0.5ML pen Inject 2.5 mg subcutaneously every 7 days. 2 mL 3    VENTOLIN  (90 Base) MCG/ACT inhaler Inhale 1-2 puffs into the lungs every 6 hours as needed for shortness of breath, wheezing or cough 18 g 1     Family History  family history is not on file. He was adopted.    Social History  Social History     Tobacco Use    Smoking status: Former     Current packs/day: 0.00     Types: Cigarettes     Quit date: 1/1/2013     Years since  "quittin.7    Smokeless tobacco: Never   Vaping Use    Vaping status: Never Used   Substance Use Topics    Alcohol use: Never    Drug use: Never   Currently on disability due to neuropathy    ROS  10 points ROS were negative otherwise mentioned in HPI      Physical Exam  Vital signs:   BP (!) 164/97 (BP Location: Left arm, Patient Position: Sitting, Cuff Size: Adult Large)   Pulse 73   Wt 101.1 kg (222 lb 12.8 oz)   SpO2 98%   BMI 33.88 kg/m    Estimated body mass index is 33.88 kg/m  as calculated from the following:    Height as of 24: 1.727 m (5' 8\").    Weight as of this encounter: 101.1 kg (222 lb 12.8 oz).  Constitutional: no distress, comfortable, pleasant   Eyes: anicteric  CVS: RRR, normal S1,S2, no murmur  Lungs: CTA B/L  Abd: soft, NT, ND, obese  Skin:no jaundice   Neurological: cranial nerves intact, decreased sensation bilateral feet  Psychological: appropriate mood       RESULTS  I have personally reviewed labs and images. I also reviewed labs with patient and discussed the result and plan of care.    Lab Results   Component Value Date    A1C 7.2 10/07/2024    A1C 9.3 2024      Latest Ref Rng 2024  8:40 AM 2024  11:46 AM   ENDO DIABETES      ALT 0 - 70 U/L 26     AST 0 - 45 U/L 18     Cholesterol <200 mg/dL 164     LDL Cholesterol Calculated <=100 mg/dL 76     HDL Cholesterol >=40 mg/dL 29 (L)     Non HDL Cholesterol <130 mg/dL 135 (H)     Triglycerides <150 mg/dL 297 (H)     TRIG (EXT) <150 mg/dL 297 (H)     Creatinine 0.67 - 1.17 mg/dL  0.68    Albumin Urine mg/L mg/L  58.1    Albumin Urine mg/g Cr 0.00 - 17.00 mg/g Cr  68.60 (H)           ASSESSMENT:    Bryant is a 64 year old male with past medical history of longstanding uncontrolled diabetes complicated with peripheral neuropathy and nephropathy, hypertension, coronary artery disease, chronic gout, hyperlipidemia, GERD presents today for uncontrolled type 2 diabetes    1) uncontrolled type 2 diabetes:His diabetes is " complicated by peripheral neuropathy and nephropathy. He has CAD.  Today's A1c 7.2% much improving.  He was started on Ozempic early 2024 with improvement of BG. Now working with MTM pharmacist to get Mounjaro titration. Currently on Mounjari 2.5 mg weekly. Tolerated it well.    - increase Jardiance 25 mg daily  - continue Lantus 50 units AM and 50 units PM   - continue Novolog sliding scale with meal -- he is instructed to report the scale he is using  - continue Metformin 1000 mg twice a day with meal  - Reduce Glipizide XR 10 mg daily    2) DM complications:  Retinopathy: early macular degeneration, NO DR last exam 2/2024  Nephropathy: positive urine microalbumin 1/2024  Neuropathy: +constant pain, , difficulty to walk, fall often    PLAN:   - lab today for BMP, urine microalbumin  - increase Jardiance 25 mg daily  - continue Lantus 50 units AM and 50 units PM   - continue Novolog sliding scale with meal -- he is instructed to report the scale he is using  - continue Metformin 1000 mg twice a day with meal  - Reduce Glipizide XR 10 mg daily  - follow up with Surendra Hernandez regularly  - Send Dexcom G7 to pharmacy  - refer podiatrist  - orthotic shoe ordered    See ASHLEY De Anda pharmD tomorrow and during titration  See MD in 4-6 months    Davon Hinds MD  Division of Diabetes and Endocrinology  Department of Medicine

## 2024-10-07 NOTE — LETTER
10/7/2024      Bryant Buck  [address] [facility] [address]      Dear Colleague,    Thank you for referring your patient, Bryant Buck, to the [facility]. Please see a copy of my visit note below.    Outcome for 10/03/24 12:34 PM:  upload claudia in clinic  Diann Gan CMA  Adult Endocrinology  [facility] [place]           Endocrinology Note         Bryant is a 64 year old male presents today for uncontrolled type 2 diabetes    HPI  Bryant is a 64 year old male with past medical history of longstanding uncontrolled diabetes complicated with peripheral neuropathy and nephropathy, hypertension, coronary artery disease, stroke, chronic gout, hyperlipidemia, GERD presents today for uncontrolled type 2 diabetes    Patient recently moved from [place] to [place] to live with his son around the end of 2023.    A1c 7.2% today came down from 9.3 in January 2024.    Current diabetes regimen  Lantus 50 units AM and 50 units PM   Metformin 1000 mg twice a day with meal  Glipizide XR 15 mg daily  Novolog sliding scale 4 times a day -- he could not remember the scale but seems to be 20 units if BG >200.    He was on Ozempic earlier this year and is now upgrading to Mounjaro. He is now on Mounjaro 2.5 mg weekly. Followed up with our endocrine MTM pharmacist team closely.    DM complications:  Retinopathy: early macular degeneration, NO DR last exam 2/2024  Nephropathy: positive urine microalbumin 1/2024  Neuropathy: +constant pain, , difficulty to walk, fall often    Past Medical History  Past Medical History:   Diagnosis Date     COPD exacerbation (H)      Coronary artery disease      Diabetes (H)      History of stroke     2-3 strokes     Hyperlipidemia      Hypertension      Myocardial infarction (H)      Neuropathy      Sleep apnea        Allergies  No Known Allergies    Medications  Current Outpatient Medications   Medication Sig Dispense Refill     allopurinol (ZYLOPRIM)  100 MG tablet Take 1 tablet (100 mg) by mouth daily 90 tablet 1     aspirin 81 MG EC tablet Take 81 mg by mouth daily       atorvastatin (LIPITOR) 80 MG tablet Take 1 tablet (80 mg) by mouth daily. 90 tablet 1     capsaicin (ZOSTRIX) 0.025 % external cream Apply topically to hands and feet 3 times daily 120 mL 1     Continuous Glucose Sensor (FREESTYLE AIDA 3 SENSOR) MISC 1 each every 14 days. Use 1 sensor every 14 days. 6 each 5     cyanocobalamin (CYANOCOBALAMIN) 1000 MCG/ML injection        DULoxetine (CYMBALTA) 60 MG capsule Take 1 capsule (60 mg) by mouth daily 90 capsule 0     empagliflozin (JARDIANCE) 10 MG TABS tablet Take 1 tablet (10 mg) by mouth daily. 90 tablet 0     fenofibrate (TRICOR) 145 MG tablet Take 1 tablet (145 mg) by mouth daily 90 tablet 1     gabapentin (NEURONTIN) 400 MG capsule Take 1 capsule (400 mg) by mouth 3 times daily 90 capsule 1     glipiZIDE (GLUCOTROL XL) 5 MG 24 hr tablet Take 3 tablets (15 mg) by mouth daily. 90 tablet 3     insulin aspart (NOVOLOG FLEXPEN) 100 UNIT/ML pen INJECT 20 UNITS SUBQ THREE TIMES DAILY and AS NEEDED PER SLIDING SCALE. Max daily dose = 80 units daily 90 mL 3     insulin glargine (LANTUS SOLOSTAR) 100 UNIT/ML pen Inject 50 Units subcutaneously 2 times daily. 90 mL 3     insulin pen needle (SURE COMFORT PEN NEEDLES) 31G X 8 MM miscellaneous USE TO INJECT INSULIN up to 6 times daily 700 each 3     isosorbide mononitrate (IMDUR) 30 MG 24 hr tablet Take 1 tablet (30 mg) by mouth daily 90 tablet 1     loratadine (CLARITIN) 10 MG tablet        metFORMIN (GLUCOPHAGE) 1000 MG tablet Take 1 tablet (1,000 mg) by mouth 2 times daily (with meals). 180 tablet 1     omeprazole (PRILOSEC) 20 MG DR capsule Take 1 capsule (20 mg) by mouth daily 90 capsule 0     pregabalin (LYRICA) 150 MG capsule        Semaglutide, 1 MG/DOSE, (OZEMPIC) 4 MG/3ML pen Inject 1 mg Subcutaneous every 7 days 3 mL 3     tirzepatide (MOUNJARO) 2.5 MG/0.5ML pen Inject 2.5 mg subcutaneously every  "7 days. 2 mL 3     VENTOLIN  (90 Base) MCG/ACT inhaler Inhale 1-2 puffs into the lungs every 6 hours as needed for shortness of breath, wheezing or cough 18 g 1     Family History  family history is not on file. He was adopted.    Social History  Social History     Tobacco Use     Smoking status: Former     Current packs/day: 0.00     Types: Cigarettes     Quit date: 2013     Years since quittin.7     Smokeless tobacco: Never   Vaping Use     Vaping status: Never Used   Substance Use Topics     Alcohol use: Never     Drug use: Never   Currently on disability due to neuropathy    ROS  10 points ROS were negative otherwise mentioned in HPI      Physical Exam  Vital signs:   BP (!) 164/97 (BP Location: Left arm, Patient Position: Sitting, Cuff Size: Adult Large)   Pulse 73   Wt 101.1 kg (222 lb 12.8 oz)   SpO2 98%   BMI 33.88 kg/m    Estimated body mass index is 33.88 kg/m  as calculated from the following:    Height as of 24: 1.727 m (5' 8\").    Weight as of this encounter: 101.1 kg (222 lb 12.8 oz).  Constitutional: no distress, comfortable, pleasant   Eyes: anicteric  CVS: RRR, normal S1,S2, no murmur  Lungs: CTA B/L  Abd: soft, NT, ND, obese  Skin:no jaundice   Neurological: cranial nerves intact, decreased sensation bilateral feet  Psychological: appropriate mood       RESULTS  I have personally reviewed labs and images. I also reviewed labs with patient and discussed the result and plan of care.    Lab Results   Component Value Date    A1C 7.2 10/07/2024    A1C 9.3 2024      Latest Ref Rng 2024  8:40 AM 2024  11:46 AM   ENDO DIABETES      ALT 0 - 70 U/L 26     AST 0 - 45 U/L 18     Cholesterol <200 mg/dL 164     LDL Cholesterol Calculated <=100 mg/dL 76     HDL Cholesterol >=40 mg/dL 29 (L)     Non HDL Cholesterol <130 mg/dL 135 (H)     Triglycerides <150 mg/dL 297 (H)     TRIG (EXT) <150 mg/dL 297 (H)     Creatinine 0.67 - 1.17 mg/dL  0.68    Albumin Urine mg/L mg/L  58.1 "    Albumin Urine mg/g Cr 0.00 - 17.00 mg/g Cr  68.60 (H)           ASSESSMENT:    Bryant is a 64 year old male with past medical history of longstanding uncontrolled diabetes complicated with peripheral neuropathy and nephropathy, hypertension, coronary artery disease, chronic gout, hyperlipidemia, GERD presents today for uncontrolled type 2 diabetes    1) uncontrolled type 2 diabetes:His diabetes is complicated by peripheral neuropathy and nephropathy. He has CAD.  Today's A1c 7.2% much improving.  He was started on Ozempic early 2024 with improvement of BG. Now working with MTM pharmacist to get Mounjaro titration. Currently on Mounjari 2.5 mg weekly. Tolerated it well.    - increase Jardiance 25 mg daily  - continue Lantus 50 units AM and 50 units PM   - continue Novolog sliding scale with meal -- he is instructed to report the scale he is using  - continue Metformin 1000 mg twice a day with meal  - Reduce Glipizide XR 10 mg daily    2) DM complications:  Retinopathy: early macular degeneration, NO DR last exam 2/2024  Nephropathy: positive urine microalbumin 1/2024  Neuropathy: +constant pain, , difficulty to walk, fall often    PLAN:   - lab today for BMP, urine microalbumin  - increase Jardiance 25 mg daily  - continue Lantus 50 units AM and 50 units PM   - continue Novolog sliding scale with meal -- he is instructed to report the scale he is using  - continue Metformin 1000 mg twice a day with meal  - Reduce Glipizide XR 10 mg daily  - follow up with Surendra Hernandez regularly  - Send Dexcom G7 to pharmacy  - refer podiatrist  - orthotic shoe ordered    See ASHLEY De Anda pharmD tomorrow and during titration  See MD in 4-6 months    Davon Hinds MD  Division of Diabetes and Endocrinology  Department of Medicine      Again, thank you for allowing me to participate in the care of your patient.        Sincerely,        Davon Hinds MD

## 2024-10-07 NOTE — NURSING NOTE
Bryant Buck's goals for this visit include:   Chief Complaint   Patient presents with    Diabetes     He requests these members of his care team be copied on today's visit information: No    PCP: No Ref-Primary, Physician    Referring Provider:  Referred Self, MD  No address on file    BP (!) 164/97 (BP Location: Left arm, Patient Position: Sitting, Cuff Size: Adult Large)   Pulse 73   Wt 101.1 kg (222 lb 12.8 oz)   SpO2 98%   BMI 33.88 kg/m      Do you need any medication refills at today's visit? Yes

## 2024-10-07 NOTE — PATIENT INSTRUCTIONS
PLAN:   - increase Jardiance 25 mg daily  - continue Lantus 50 units AM and 50 units PM   - continue Metformin 1000 mg twice a day with meal  - Reduce Glipizide XR 10 mg daily  - please call us on your sliding scale dose    - lab today    - refer podiatrist    See Surendra Hernandez tomorrow    If you have any questions, please do not hesitate to call [facility] at [phone] and ask for Endocrinology clinic.    Sincerely,    Davon Hinds MD  Endocrinology

## 2024-10-08 ENCOUNTER — VIRTUAL VISIT (OUTPATIENT)
Dept: PHARMACY | Facility: CLINIC | Age: 64
End: 2024-10-08
Attending: INTERNAL MEDICINE
Payer: COMMERCIAL

## 2024-10-08 DIAGNOSIS — E11.65 TYPE 2 DIABETES MELLITUS WITH HYPERGLYCEMIA, UNSPECIFIED WHETHER LONG TERM INSULIN USE (H): Primary | ICD-10-CM

## 2024-10-08 NOTE — PROGRESS NOTES
Medication Therapy Management (MTM) Encounter    ASSESSMENT:                            Medication Adherence/Access: See below for considerations    Type 2 Diabetes: A1C above goal of < 7%.  Improvement in glycemic control since upgrading Ozempic to Mounjaro.  Given tolerating well, would benefit from dose increase today with subsequent reduction of insulin to mitigate risk of hypoglycemia.  Will continue to follow to titrate.  And goal is to remove as much insulin as possible and taper off glipizide as well.     PLAN:                            After 1 more week of Mounjaro 2.5 mg, increase to 5 mg weekly    When you make Mounjaro dose change, please decrease Lantus to 46 units 2 times daily and continue to adjust Humalog on your own as you have been.    Please let me know if the pharmacy has any trouble filling the Dexcom G7 prescription and I can help with the prior authorization    Please let me know if you have any lows less than 70    Endocrine Team & Next Follow-Up:  2024 with Dr. Mays  2024 with Surendra    SUBJECTIVE/OBJECTIVE:                          Bryant Buck is a 64 year old male called for a follow-up visit. He was referred to me from Dr. Mays.      Reason for visit: Medication Therapy Management (MTM).      Allergies/ADRs: Reviewed in chart  Past Medical History: Reviewed in chart  Social History     Tobacco Use    Smoking status: Former     Current packs/day: 0.00     Types: Cigarettes     Quit date: 2013     Years since quittin.7    Smokeless tobacco: Never   Vaping Use    Vaping status: Never Used   Substance Use Topics    Alcohol use: Never    Drug use: Never        Medication Adherence/Access: Adherence is reflected well in the dispense report. [facility]    Type 2 Diabetes:   Diabetes Medication(s)       Biguanides       metFORMIN (GLUCOPHAGE) 1000 MG tablet Take 1 tablet (1,000 mg) by mouth 2 times daily (with meals)       Insulin       LANTUS SOLOSTAR 100 UNIT/ML soln  "Inject 50 Units Subcutaneous 2 times daily     NOVOLOG FLEXPEN 100 UNIT/ML soln INJECT 0-50 UNITS SUBQ THREE TIMES DAILY AS NEEDED PER SLIDING SCALE. Per patient, using less overall since starting Ozempic. Has been giving roughly 20 units three times daily        Sodium-Glucose Co-Transporter 2 (SGLT2) Inhibitors       empagliflozin (JARDIANCE) 25 MG TABS tablet Take 1 tablet (25 mg) by mouth daily       Sulfonylureas       glipiZIDE (GLUCOTROL XL) 5 MG 24 hr tablet Take 10 mg by mouth daily       Incretin Mimetic Agents       semaglutide (OZEMPIC) 2 MG/3ML pen Inject subcu 1 mg weekly. Started feeling some nausea post-meals when started this dose, slight improvement with time but overall tolerable per patient.          Having issues with Susu (little data to review today) --prescription sent yesterday for Dexcom G7 but patient has not picked up yet. fingerstick FP-80, -200s          Lab Results   Component Value Date    GFRESTIMATED >90 10/07/2024    GFRESTIMATED >90 2024     BP Readings from Last 1 Encounters:   10/07/24 (!) 164/97     Pulse Readings from Last 1 Encounters:   10/07/24 73     Wt Readings from Last 1 Encounters:   10/07/24 222 lb 12.8 oz (101.1 kg)     Ht Readings from Last 1 Encounters:   24 5' 8\" (1.727 m)     Estimated body mass index is 33.88 kg/m  as calculated from the following:    Height as of 24: 5' 8\" (1.727 m).    Weight as of 10/7/24: 222 lb 12.8 oz (101.1 kg).    Temp Readings from Last 1 Encounters:   24 97.2  F (36.2  C) (Tympanic)       ----------------  I spent 15 minutes with this patient today. Dr. Mays was provided the recommendations above via routed note and is the authorizing prescriber for this visit through the pharmacist collaborative practice agreement. A copy of the visit note was provided to the patient's provider(s).    The patient was given to the patient a summary of these recommendations.     Surendra Hernandez, PharmD, " [facility]  Endocrine & Diabetes MTM Pharmacist  [address] [place] [address]    Telemedicine Visit Details  Type of service:  Telephone visit  Start Time: 9AM  End Time: 9:15AM  Originating Location (pt. Location): Home  Provider has received verbal consent for a visit from the patient? Yes     Medication Therapy Recommendations  Type 2 diabetes mellitus with hyperglycemia, unspecified whether long term insulin use (H)    Current Medication: tirzepatide (MOUNJARO) 2.5 MG/0.5ML pen (Discontinued)   Rationale: Dose too low - Dosage too low - Effectiveness   Recommendation: Increase Dose   Status: Accepted per CPA

## 2024-10-09 DIAGNOSIS — E11.42 TYPE 2 DIABETES MELLITUS WITH DIABETIC POLYNEUROPATHY, WITH LONG-TERM CURRENT USE OF INSULIN (H): Primary | ICD-10-CM

## 2024-10-09 DIAGNOSIS — Z79.4 TYPE 2 DIABETES MELLITUS WITH DIABETIC POLYNEUROPATHY, WITH LONG-TERM CURRENT USE OF INSULIN (H): Primary | ICD-10-CM

## 2024-10-09 LAB
ALBUMIN SERPL BCG-MCNC: 4 G/DL (ref 3.5–5.2)
MAGNESIUM SERPL-MCNC: 2 MG/DL (ref 1.7–2.3)

## 2024-10-10 DIAGNOSIS — E55.9 VITAMIN D DEFICIENCY: Primary | ICD-10-CM

## 2024-10-10 LAB — VIT D+METAB SERPL-MCNC: 9 NG/ML (ref 20–50)

## 2024-10-10 RX ORDER — ERGOCALCIFEROL 1.25 MG/1
50000 CAPSULE, LIQUID FILLED ORAL WEEKLY
Qty: 10 CAPSULE | Refills: 0 | Status: SHIPPED | OUTPATIENT
Start: 2024-10-10

## 2024-10-18 ENCOUNTER — OFFICE VISIT (OUTPATIENT)
Dept: PODIATRY | Facility: CLINIC | Age: 64
End: 2024-10-18
Attending: INTERNAL MEDICINE
Payer: COMMERCIAL

## 2024-10-18 VITALS
OXYGEN SATURATION: 97 % | WEIGHT: 222 LBS | SYSTOLIC BLOOD PRESSURE: 168 MMHG | HEART RATE: 80 BPM | HEIGHT: 68 IN | BODY MASS INDEX: 33.65 KG/M2 | DIASTOLIC BLOOD PRESSURE: 90 MMHG

## 2024-10-18 DIAGNOSIS — E11.621 DIABETIC ULCER OF TOE OF LEFT FOOT ASSOCIATED WITH TYPE 2 DIABETES MELLITUS, LIMITED TO BREAKDOWN OF SKIN (H): Primary | ICD-10-CM

## 2024-10-18 DIAGNOSIS — E11.42 TYPE 2 DIABETES MELLITUS WITH DIABETIC POLYNEUROPATHY, WITH LONG-TERM CURRENT USE OF INSULIN (H): ICD-10-CM

## 2024-10-18 DIAGNOSIS — L97.521 DIABETIC ULCER OF TOE OF LEFT FOOT ASSOCIATED WITH TYPE 2 DIABETES MELLITUS, LIMITED TO BREAKDOWN OF SKIN (H): Primary | ICD-10-CM

## 2024-10-18 DIAGNOSIS — Z79.4 TYPE 2 DIABETES MELLITUS WITH DIABETIC POLYNEUROPATHY, WITH LONG-TERM CURRENT USE OF INSULIN (H): ICD-10-CM

## 2024-10-18 PROCEDURE — 99203 OFFICE O/P NEW LOW 30 MIN: CPT | Performed by: PODIATRIST

## 2024-10-18 RX ORDER — HONEY 100 %
PASTE (ML) TOPICAL DAILY
Qty: 44 ML | Refills: 1 | Status: SHIPPED | OUTPATIENT
Start: 2024-10-18

## 2024-10-18 RX ORDER — CEPHALEXIN 500 MG/1
500 CAPSULE ORAL 4 TIMES DAILY
Qty: 40 CAPSULE | Refills: 0 | Status: SHIPPED | OUTPATIENT
Start: 2024-10-18

## 2024-10-18 NOTE — LETTER
10/18/2024      Bryant Buck  [address] [facility] [address]      Dear Colleague,    Thank you for referring your patient, Bryant Buck, to the [facility]. Please see a copy of my visit note below.    PATIENT HISTORY:  Bryant Buck is a 64 year old male who presents to clinic for a diabetic foot evaluation.  The patient relates having an ulcer on the bottom of the left great toe.  The patient relates some numbness to the feet.   The patient relates blood sugars are within normal limits.  The patient was sent by Dr. Francois  for consultation on the right and left foot.       REVIEW OF SYSTEMS:  Constitutional, HEENT, cardiovascular, pulmonary, GI, , musculoskeletal, neuro, skin, endocrine and psych systems are negative, except as otherwise noted.     PAST MEDICAL HISTORY:   Past Medical History:   Diagnosis Date     COPD exacerbation (H)      Coronary artery disease      Diabetes (H)      History of stroke     2-3 strokes     Hyperlipidemia      Hypertension      Myocardial infarction (H)      Neuropathy      Sleep apnea         PAST SURGICAL HISTORY: No past surgical history on file.     MEDICATIONS:   Current Outpatient Medications:      allopurinol (ZYLOPRIM) 100 MG tablet, Take 1 tablet (100 mg) by mouth daily, Disp: 90 tablet, Rfl: 1     aspirin 81 MG EC tablet, Take 81 mg by mouth daily, Disp: , Rfl:      atorvastatin (LIPITOR) 80 MG tablet, Take 1 tablet (80 mg) by mouth daily., Disp: 90 tablet, Rfl: 1     Continuous Glucose Sensor (DEXCOM G7 SENSOR) MISC, Change every 10 days., Disp: 3 each, Rfl: 5     cyanocobalamin (CYANOCOBALAMIN) 1000 MCG/ML injection, , Disp: , Rfl:      DULoxetine (CYMBALTA) 60 MG capsule, Take 1 capsule (60 mg) by mouth daily, Disp: 90 capsule, Rfl: 0     empagliflozin (JARDIANCE) 25 MG TABS tablet, Take 1 tablet (25 mg) by mouth daily., Disp: 90 tablet, Rfl: 1     fenofibrate (TRICOR) 145 MG tablet, Take 1 tablet (145  mg) by mouth daily, Disp: 90 tablet, Rfl: 1     gabapentin (NEURONTIN) 400 MG capsule, Take 1 capsule (400 mg) by mouth 3 times daily, Disp: 90 capsule, Rfl: 1     glipiZIDE (GLUCOTROL XL) 5 MG 24 hr tablet, Take 2 tablets (10 mg) by mouth daily., Disp: 180 tablet, Rfl: 2     insulin aspart (NOVOLOG FLEXPEN) 100 UNIT/ML pen, INJECT 20 UNITS SUBQ THREE TIMES DAILY and AS NEEDED PER SLIDING SCALE. Max daily dose = 80 units daily, Disp: 90 mL, Rfl: 3     insulin glargine (LANTUS SOLOSTAR) 100 UNIT/ML pen, Inject 50 Units subcutaneously 2 times daily., Disp: 90 mL, Rfl: 3     insulin pen needle (SURE COMFORT PEN NEEDLES) 31G X 8 MM miscellaneous, USE TO INJECT INSULIN up to 6 times daily, Disp: 700 each, Rfl: 3     isosorbide mononitrate (IMDUR) 30 MG 24 hr tablet, Take 1 tablet (30 mg) by mouth daily, Disp: 90 tablet, Rfl: 1     loratadine (CLARITIN) 10 MG tablet, , Disp: , Rfl:      metFORMIN (GLUCOPHAGE) 1000 MG tablet, Take 1 tablet (1,000 mg) by mouth 2 times daily (with meals)., Disp: 180 tablet, Rfl: 1     omeprazole (PRILOSEC) 20 MG DR capsule, Take 1 capsule (20 mg) by mouth daily, Disp: 90 capsule, Rfl: 0     pregabalin (LYRICA) 150 MG capsule, , Disp: , Rfl:      tirzepatide (MOUNJARO) 5 MG/0.5ML pen, Inject 5 mg subcutaneously every 7 days., Disp: 2 mL, Rfl: 3     VENTOLIN  (90 Base) MCG/ACT inhaler, Inhale 1-2 puffs into the lungs every 6 hours as needed for shortness of breath, wheezing or cough, Disp: 18 g, Rfl: 1     vitamin D2 (ERGOCALCIFEROL) 41853 units (1250 mcg) capsule, Take 1 capsule (50,000 Units) by mouth once a week., Disp: 10 capsule, Rfl: 0     ALLERGIES:  No Known Allergies     SOCIAL HISTORY:   Social History     Socioeconomic History     Marital status:      Spouse name: Not on file     Number of children: Not on file     Years of education: Not on file     Highest education level: Not on file   Occupational History     Not on file   Tobacco Use     Smoking status: Former      Current packs/day: 0.00     Types: Cigarettes     Quit date: 2013     Years since quittin.8     Smokeless tobacco: Never   Vaping Use     Vaping status: Never Used   Substance and Sexual Activity     Alcohol use: Never     Drug use: Never     Sexual activity: Not Currently     Partners: Female, Male     Birth control/protection: None   Other Topics Concern     Not on file   Social History Narrative     Not on file     Social Determinants of Health     Financial Resource Strain: High Risk (2024)    Financial Resource Strain      Within the past 12 months, have you or your family members you live with been unable to get utilities (heat, electricity) when it was really needed?: Yes   Food Insecurity: Low Risk  (2024)    Food Insecurity      Within the past 12 months, did you worry that your food would run out before you got money to buy more?: No      Within the past 12 months, did the food you bought just not last and you didn t have money to get more?: No   Transportation Needs: Low Risk  (2024)    Transportation Needs      Within the past 12 months, has lack of transportation kept you from medical appointments, getting your medicines, non-medical meetings or appointments, work, or from getting things that you need?: No   Physical Activity: Not on file   Stress: Not on file   Social Connections: Not on file   Interpersonal Safety: Low Risk  (2023)    Interpersonal Safety      Do you feel physically and emotionally safe where you currently live?: Yes      Within the past 12 months, have you been hit, slapped, kicked or otherwise physically hurt by someone?: No      Within the past 12 months, have you been humiliated or emotionally abused in other ways by your partner or ex-partner?: No   Housing Stability: Low Risk  (2024)    Housing Stability      Do you have housing? : Yes      Are you worried about losing your housing?: No        FAMILY HISTORY:   Family History   Adopted: Yes  "  Problem Relation Age of Onset     Glaucoma No family hx of      Macular Degeneration No family hx of         Vitals: BP (!) 168/90   Pulse 80   Ht 1.727 m (5' 8\")   Wt 100.7 kg (222 lb)   SpO2 97%   BMI 33.75 kg/m         Lower Extremity Evaluation:     Dermatologic: Noted full-thickness ulceration on the plantar aspect of the left great toe.  No surrounding erythema or edema noted.  No drainage noted.  Skin is otherwise intact to both lower extremities without significant lesions, rash or abrasion.        Vascular: DP & PT pulses are intact & regular bilaterally.   Capillary filling and skin temperature is normal to both lower extremities.  There are no varicosities, no edema and no trophic changes noted.      Neurologic:   No evidence of weakness in the lower extremities.  Noted evidence of neuropathy with diminished sensation bilaterally.       Musculoskeletal: Patient is ambulatory without assistive device or brace.  No gross ankle deformity noted.  No foot or ankle joint effusion is noted.  One notes hammertoe contracture of the lesser toes bilaterally.    Labs: Hemoglobin A1c: 9.3      Assessment:        ICD-10-CM    1. Diabetic ulcer of toe of left foot associated with type 2 diabetes mellitus, limited to breakdown of skin (H)  E11.621     L97.521       2. Type 2 diabetes mellitus with diabetic polyneuropathy, with long-term current use of insulin (H)  E11.42 Orthopedic  Referral    Z79.4               Plan:  I have explained to the patient the underlying condition affecting the feet.  At this point, the patient was instructed to be completely nonweightbearing on the left foot with use of a knee scooter.  The patient was instructed to bandage to the left toe ulcer daily with use of Medihoney ointment.   The patient was placed on a 10-day course of oral Keflex 500 mg p.o. 4 times daily.    I have discussed with the patient the importance of diabetic foot care and daily inspection of the feet.  " The patient was instructed to come in anytime if there is any concern about the feet with redness, swelling or drainage is noted.    Bryant verbalized agreement with and understanding of the rational for the diagnosis and treatment plan.  All questions were answered to best of my ability and the patient's satisfaction. The patient was advised to contact the clinic with any questions that may arise after the clinic visit.      Disclaimer: This note consists of symbols derived from keyboarding, dictation and/or voice recognition software. As a result, there may be errors in the script that have gone undetected. Please consider this when interpreting information found in this chart.        NOBLE Odom D.P.M., [email or web address].

## 2024-10-18 NOTE — PROGRESS NOTES
PATIENT HISTORY:  Bryant Buck is a 64 year old male who presents to clinic for a diabetic foot evaluation.  The patient relates having an ulcer on the bottom of the left great toe.  The patient relates some numbness to the feet.   The patient relates blood sugars are within normal limits.  The patient was sent by Dr. Francois  for consultation on the right and left foot.       REVIEW OF SYSTEMS:  Constitutional, HEENT, cardiovascular, pulmonary, GI, , musculoskeletal, neuro, skin, endocrine and psych systems are negative, except as otherwise noted.     PAST MEDICAL HISTORY:   Past Medical History:   Diagnosis Date    COPD exacerbation (H)     Coronary artery disease     Diabetes (H)     History of stroke     2-3 strokes    Hyperlipidemia     Hypertension     Myocardial infarction (H)     Neuropathy     Sleep apnea         PAST SURGICAL HISTORY: No past surgical history on file.     MEDICATIONS:   Current Outpatient Medications:     allopurinol (ZYLOPRIM) 100 MG tablet, Take 1 tablet (100 mg) by mouth daily, Disp: 90 tablet, Rfl: 1    aspirin 81 MG EC tablet, Take 81 mg by mouth daily, Disp: , Rfl:     atorvastatin (LIPITOR) 80 MG tablet, Take 1 tablet (80 mg) by mouth daily., Disp: 90 tablet, Rfl: 1    Continuous Glucose Sensor (DEXCOM G7 SENSOR) MISC, Change every 10 days., Disp: 3 each, Rfl: 5    cyanocobalamin (CYANOCOBALAMIN) 1000 MCG/ML injection, , Disp: , Rfl:     DULoxetine (CYMBALTA) 60 MG capsule, Take 1 capsule (60 mg) by mouth daily, Disp: 90 capsule, Rfl: 0    empagliflozin (JARDIANCE) 25 MG TABS tablet, Take 1 tablet (25 mg) by mouth daily., Disp: 90 tablet, Rfl: 1    fenofibrate (TRICOR) 145 MG tablet, Take 1 tablet (145 mg) by mouth daily, Disp: 90 tablet, Rfl: 1    gabapentin (NEURONTIN) 400 MG capsule, Take 1 capsule (400 mg) by mouth 3 times daily, Disp: 90 capsule, Rfl: 1    glipiZIDE (GLUCOTROL XL) 5 MG 24 hr tablet, Take 2 tablets (10 mg) by mouth daily., Disp: 180 tablet, Rfl:  2    insulin aspart (NOVOLOG FLEXPEN) 100 UNIT/ML pen, INJECT 20 UNITS SUBQ THREE TIMES DAILY and AS NEEDED PER SLIDING SCALE. Max daily dose = 80 units daily, Disp: 90 mL, Rfl: 3    insulin glargine (LANTUS SOLOSTAR) 100 UNIT/ML pen, Inject 50 Units subcutaneously 2 times daily., Disp: 90 mL, Rfl: 3    insulin pen needle (SURE COMFORT PEN NEEDLES) 31G X 8 MM miscellaneous, USE TO INJECT INSULIN up to 6 times daily, Disp: 700 each, Rfl: 3    isosorbide mononitrate (IMDUR) 30 MG 24 hr tablet, Take 1 tablet (30 mg) by mouth daily, Disp: 90 tablet, Rfl: 1    loratadine (CLARITIN) 10 MG tablet, , Disp: , Rfl:     metFORMIN (GLUCOPHAGE) 1000 MG tablet, Take 1 tablet (1,000 mg) by mouth 2 times daily (with meals)., Disp: 180 tablet, Rfl: 1    omeprazole (PRILOSEC) 20 MG DR capsule, Take 1 capsule (20 mg) by mouth daily, Disp: 90 capsule, Rfl: 0    pregabalin (LYRICA) 150 MG capsule, , Disp: , Rfl:     tirzepatide (MOUNJARO) 5 MG/0.5ML pen, Inject 5 mg subcutaneously every 7 days., Disp: 2 mL, Rfl: 3    VENTOLIN  (90 Base) MCG/ACT inhaler, Inhale 1-2 puffs into the lungs every 6 hours as needed for shortness of breath, wheezing or cough, Disp: 18 g, Rfl: 1    vitamin D2 (ERGOCALCIFEROL) 03560 units (1250 mcg) capsule, Take 1 capsule (50,000 Units) by mouth once a week., Disp: 10 capsule, Rfl: 0     ALLERGIES:  No Known Allergies     SOCIAL HISTORY:   Social History     Socioeconomic History    Marital status:      Spouse name: Not on file    Number of children: Not on file    Years of education: Not on file    Highest education level: Not on file   Occupational History    Not on file   Tobacco Use    Smoking status: Former     Current packs/day: 0.00     Types: Cigarettes     Quit date: 2013     Years since quittin.8    Smokeless tobacco: Never   Vaping Use    Vaping status: Never Used   Substance and Sexual Activity    Alcohol use: Never    Drug use: Never    Sexual activity: Not Currently      "Partners: Female, Male     Birth control/protection: None   Other Topics Concern    Not on file   Social History Narrative    Not on file     Social Determinants of Health     Financial Resource Strain: High Risk (1/26/2024)    Financial Resource Strain     Within the past 12 months, have you or your family members you live with been unable to get utilities (heat, electricity) when it was really needed?: Yes   Food Insecurity: Low Risk  (1/26/2024)    Food Insecurity     Within the past 12 months, did you worry that your food would run out before you got money to buy more?: No     Within the past 12 months, did the food you bought just not last and you didn t have money to get more?: No   Transportation Needs: Low Risk  (1/26/2024)    Transportation Needs     Within the past 12 months, has lack of transportation kept you from medical appointments, getting your medicines, non-medical meetings or appointments, work, or from getting things that you need?: No   Physical Activity: Not on file   Stress: Not on file   Social Connections: Not on file   Interpersonal Safety: Low Risk  (12/14/2023)    Interpersonal Safety     Do you feel physically and emotionally safe where you currently live?: Yes     Within the past 12 months, have you been hit, slapped, kicked or otherwise physically hurt by someone?: No     Within the past 12 months, have you been humiliated or emotionally abused in other ways by your partner or ex-partner?: No   Housing Stability: Low Risk  (1/26/2024)    Housing Stability     Do you have housing? : Yes     Are you worried about losing your housing?: No        FAMILY HISTORY:   Family History   Adopted: Yes   Problem Relation Age of Onset    Glaucoma No family hx of     Macular Degeneration No family hx of         Vitals: BP (!) 168/90   Pulse 80   Ht 1.727 m (5' 8\")   Wt 100.7 kg (222 lb)   SpO2 97%   BMI 33.75 kg/m         Lower Extremity Evaluation:     Dermatologic: Noted full-thickness " ulceration on the plantar aspect of the left great toe.  No surrounding erythema or edema noted.  No drainage noted.  Skin is otherwise intact to both lower extremities without significant lesions, rash or abrasion.        Vascular: DP & PT pulses are intact & regular bilaterally.   Capillary filling and skin temperature is normal to both lower extremities.  There are no varicosities, no edema and no trophic changes noted.      Neurologic:   No evidence of weakness in the lower extremities.  Noted evidence of neuropathy with diminished sensation bilaterally.       Musculoskeletal: Patient is ambulatory without assistive device or brace.  No gross ankle deformity noted.  No foot or ankle joint effusion is noted.  One notes hammertoe contracture of the lesser toes bilaterally.    Labs: Hemoglobin A1c: 9.3      Assessment:        ICD-10-CM    1. Diabetic ulcer of toe of left foot associated with type 2 diabetes mellitus, limited to breakdown of skin (H)  E11.621     L97.521       2. Type 2 diabetes mellitus with diabetic polyneuropathy, with long-term current use of insulin (H)  E11.42 Orthopedic  Referral    Z79.4               Plan:  I have explained to the patient the underlying condition affecting the feet.  At this point, the patient was instructed to be completely nonweightbearing on the left foot with use of a knee scooter.  The patient was instructed to bandage to the left toe ulcer daily with use of Medihoney ointment.   The patient was placed on a 10-day course of oral Keflex 500 mg p.o. 4 times daily.    I have discussed with the patient the importance of diabetic foot care and daily inspection of the feet.  The patient was instructed to come in anytime if there is any concern about the feet with redness, swelling or drainage is noted.    Bryant verbalized agreement with and understanding of the rational for the diagnosis and treatment plan.  All questions were answered to best of my ability and  the patient's satisfaction. The patient was advised to contact the clinic with any questions that may arise after the clinic visit.      Disclaimer: This note consists of symbols derived from keyboarding, dictation and/or voice recognition software. As a result, there may be errors in the script that have gone undetected. Please consider this when interpreting information found in this chart.        NOBLE Odom D.P.M., [email or web address].

## 2024-10-18 NOTE — PATIENT INSTRUCTIONS
Next Steps    Keep the wound covered at all times with a clean bandage.  Change the bandage after showering.  Do not soak the wound.  If prescribed antibiotics, take them according to the prescribed instructions until finished.  If you have any side effects, notify the doctor.  If you notice worsening symptoms such as redness traveling up the foot or leg, fever or chills, nausea, lack of appetite or elevated blood sugars, please report to the nearest ER for further evaluation and treatment.

## 2024-10-18 NOTE — NURSING NOTE
"Chief Complaint   Patient presents with    Consult     Diabetic with neuropathy ulcer on the bottom of the great left toe- swelling       Initial BP (!) 168/90   Pulse 80   Ht 1.727 m (5' 8\")   Wt 100.7 kg (222 lb)   SpO2 97%   BMI 33.75 kg/m   Estimated body mass index is 33.75 kg/m  as calculated from the following:    Height as of this encounter: 1.727 m (5' 8\").    Weight as of this encounter: 100.7 kg (222 lb).  Medications and allergies reviewed.      Arlyn GUY MA    "

## 2024-11-04 ENCOUNTER — VIRTUAL VISIT (OUTPATIENT)
Dept: PHARMACY | Facility: CLINIC | Age: 64
End: 2024-11-04
Attending: INTERNAL MEDICINE
Payer: COMMERCIAL

## 2024-11-04 DIAGNOSIS — E11.65 TYPE 2 DIABETES MELLITUS WITH HYPERGLYCEMIA, UNSPECIFIED WHETHER LONG TERM INSULIN USE (H): Primary | ICD-10-CM

## 2024-11-04 RX ORDER — TIRZEPATIDE 7.5 MG/.5ML
7.5 INJECTION, SOLUTION SUBCUTANEOUS
Qty: 2 ML | Refills: 3 | Status: SHIPPED | OUTPATIENT
Start: 2024-11-04

## 2024-11-04 NOTE — PROGRESS NOTES
Medication Therapy Management (MTM) Encounter    ASSESSMENT:                            Medication Adherence/Access: See below for considerations    Type 2 Diabetes: A1C above goal of < 7%.  Improvement in glycemic control since upgrading Ozempic to Mounjaro.  Given tolerating well, would benefit from dose increase today with subsequent reduction of insulin to mitigate risk of hypoglycemia.  Will continue to follow to titrate.  And goal is to remove as much insulin as possible and taper off glipizide as well.     PLAN:                            After 1 more week of Mounjaro 5 mg, increase to 7.5 mg weekly    When you make Mounjaro dose change, please decrease Lantus to 46 units 2 times daily and continue to adjust Humalog on your own as you have been.    Please let me know if you have any lows less than 70    Per request of Dr. Mays -- confirmed patient started 10-week course of 50,000 units of VitD weekly. Will transition to 1000 units D3 daily once completed.    Endocrine Team & Next Follow-Up:  2025 with Dr. Mays  2024 with Surendra    SUBJECTIVE/OBJECTIVE:                          Bryant Buck is a 64 year old male called for a follow-up visit. He was referred to me from Dr. Mays.      Reason for visit: Medication Therapy Management (MTM).      Allergies/ADRs: Reviewed in chart  Past Medical History: Reviewed in chart  Social History     Tobacco Use    Smoking status: Former     Current packs/day: 0.00     Types: Cigarettes     Quit date: 2013     Years since quittin.8    Smokeless tobacco: Never   Vaping Use    Vaping status: Never Used   Substance Use Topics    Alcohol use: Never    Drug use: Never        Medication Adherence/Access: Adherence is reflected well in the dispense report. [facility]    Type 2 Diabetes:   Diabetes Medication(s)       Biguanides       metFORMIN (GLUCOPHAGE) 1000 MG tablet Take 1 tablet (1,000 mg) by mouth 2 times daily (with meals)       Insulin       LANTUS  "SOLOSTAR 100 UNIT/ML soln Inject 50 Units Subcutaneous 2 times daily     NOVOLOG FLEXPEN 100 UNIT/ML soln INJECT 0-50 UNITS SUBQ THREE TIMES DAILY AS NEEDED PER SLIDING SCALE. Per patient, using less overall since starting Ozempic. Has been giving roughly 15 units three times daily        Sodium-Glucose Co-Transporter 2 (SGLT2) Inhibitors       empagliflozin (JARDIANCE) 25 MG TABS tablet Take 1 tablet (25 mg) by mouth daily       Sulfonylureas       glipiZIDE (GLUCOTROL XL) 5 MG 24 hr tablet Take 10 mg by mouth daily      Mounjaro 5 mg weekly -- no side effects. Switched from Ozempic earlier this year.            Lab Results   Component Value Date    GFRESTIMATED >90 10/07/2024    GFRESTIMATED >90 01/29/2024     BP Readings from Last 1 Encounters:   10/18/24 (!) 168/90     Pulse Readings from Last 1 Encounters:   10/18/24 80     Wt Readings from Last 1 Encounters:   10/18/24 222 lb (100.7 kg)     Ht Readings from Last 1 Encounters:   10/18/24 5' 8\" (1.727 m)     Estimated body mass index is 33.75 kg/m  as calculated from the following:    Height as of 10/18/24: 5' 8\" (1.727 m).    Weight as of 10/18/24: 222 lb (100.7 kg).    Temp Readings from Last 1 Encounters:   01/29/24 97.2  F (36.2  C) (Tympanic)       ----------------  I spent 15 minutes with this patient today. Dr. Mays was provided the recommendations above via routed note and is the authorizing prescriber for this visit through the pharmacist collaborative practice agreement. A copy of the visit note was provided to the patient's provider(s).    The patient was given to the patient a summary of these recommendations.     Surendra Hernandez, PharmD, [facility]  Endocrine & Diabetes [facility] Pharmacist  [address] [place] [id number]    Telemedicine Visit Details  Type of service:  Telephone visit  Start Time: 9AM  End Time: 9:15AM  Originating Location (pt. Location): Home  Provider has received verbal consent for a visit from the patient? Yes     Medication " Therapy Recommendations  Type 2 diabetes mellitus with hyperglycemia, unspecified whether long term insulin use (H)   1 Current Medication: tirzepatide (MOUNJARO) 5 MG/0.5ML pen   Current Medication Sig: Inject 5 mg subcutaneously every 7 days.   Rationale: Dose too low - Dosage too low - Effectiveness   Recommendation: Increase Dose   Status: Accepted per CPA   Identified Date: 11/4/2024 Completed Date: 11/4/2024

## 2024-11-08 ENCOUNTER — OFFICE VISIT (OUTPATIENT)
Dept: PODIATRY | Facility: CLINIC | Age: 64
End: 2024-11-08
Payer: COMMERCIAL

## 2024-11-08 VITALS
HEIGHT: 68 IN | WEIGHT: 222 LBS | SYSTOLIC BLOOD PRESSURE: 153 MMHG | BODY MASS INDEX: 33.65 KG/M2 | HEART RATE: 75 BPM | DIASTOLIC BLOOD PRESSURE: 89 MMHG

## 2024-11-08 DIAGNOSIS — M20.41 ACQUIRED BILATERAL HAMMER TOES: Primary | ICD-10-CM

## 2024-11-08 DIAGNOSIS — E11.43 TYPE II DIABETES MELLITUS WITH PERIPHERAL AUTONOMIC NEUROPATHY (H): ICD-10-CM

## 2024-11-08 DIAGNOSIS — M20.42 ACQUIRED BILATERAL HAMMER TOES: Primary | ICD-10-CM

## 2024-11-08 PROCEDURE — 99213 OFFICE O/P EST LOW 20 MIN: CPT | Performed by: PODIATRIST

## 2024-11-08 NOTE — Clinical Note
11/8/2024      Bryant Buck  [address] [facility] [address]      Dear Colleague,    Thank you for referring your patient, Bryant Buck, to the [facility]. Please see a copy of my visit note below.    PATIENT HISTORY:  Bryant Buck is a 64 year old male who presents to clinic for a diabetic foot evaluation.  *** The patient relates some numbness to the feet.  The patient denies any redness, swelling or open sores on both feet.  The patient relates blood sugars are within normal limits.  The patient was sent by  *** for consultation on the {RIGHT /LEFT:[id number]} foot.       REVIEW OF SYSTEMS:  Constitutional, HEENT, cardiovascular, pulmonary, GI, , musculoskeletal, neuro, skin, endocrine and psych systems are negative, except as otherwise noted.     PAST MEDICAL HISTORY:   Past Medical History:   Diagnosis Date    COPD exacerbation (H)     Coronary artery disease     Diabetes (H)     History of stroke     2-3 strokes    Hyperlipidemia     Hypertension     Myocardial infarction (H)     Neuropathy     Sleep apnea         PAST SURGICAL HISTORY: No past surgical history on file.     MEDICATIONS:   Current Outpatient Medications:     allopurinol (ZYLOPRIM) 100 MG tablet, Take 1 tablet (100 mg) by mouth daily, Disp: 90 tablet, Rfl: 1    aspirin 81 MG EC tablet, Take 81 mg by mouth daily, Disp: , Rfl:     atorvastatin (LIPITOR) 80 MG tablet, Take 1 tablet (80 mg) by mouth daily., Disp: 90 tablet, Rfl: 1    cephALEXin (KEFLEX) 500 MG capsule, Take 1 capsule (500 mg) by mouth 4 times daily., Disp: 40 capsule, Rfl: 0    Continuous Glucose Sensor (DEXCOM G7 SENSOR) MISC, Change every 10 days., Disp: 3 each, Rfl: 5    cyanocobalamin (CYANOCOBALAMIN) 1000 MCG/ML injection, , Disp: , Rfl:     DULoxetine (CYMBALTA) 60 MG capsule, Take 1 capsule (60 mg) by mouth daily, Disp: 90 capsule, Rfl: 0    empagliflozin (JARDIANCE) 25 MG TABS tablet, Take 1 tablet (25 mg) by mouth  daily., Disp: 90 tablet, Rfl: 1    fenofibrate (TRICOR) 145 MG tablet, Take 1 tablet (145 mg) by mouth daily, Disp: 90 tablet, Rfl: 1    gabapentin (NEURONTIN) 400 MG capsule, Take 1 capsule (400 mg) by mouth 3 times daily, Disp: 90 capsule, Rfl: 1    glipiZIDE (GLUCOTROL XL) 5 MG 24 hr tablet, Take 2 tablets (10 mg) by mouth daily., Disp: 180 tablet, Rfl: 2    insulin aspart (NOVOLOG FLEXPEN) 100 UNIT/ML pen, INJECT 20 UNITS SUBQ THREE TIMES DAILY and AS NEEDED PER SLIDING SCALE. Max daily dose = 80 units daily, Disp: 90 mL, Rfl: 3    insulin glargine (LANTUS SOLOSTAR) 100 UNIT/ML pen, Inject 50 Units subcutaneously 2 times daily., Disp: 90 mL, Rfl: 3    insulin pen needle (SURE COMFORT PEN NEEDLES) 31G X 8 MM miscellaneous, USE TO INJECT INSULIN up to 6 times daily, Disp: 700 each, Rfl: 3    isosorbide mononitrate (IMDUR) 30 MG 24 hr tablet, Take 1 tablet (30 mg) by mouth daily, Disp: 90 tablet, Rfl: 1    loratadine (CLARITIN) 10 MG tablet, , Disp: , Rfl:     metFORMIN (GLUCOPHAGE) 1000 MG tablet, Take 1 tablet (1,000 mg) by mouth 2 times daily (with meals)., Disp: 180 tablet, Rfl: 1    omeprazole (PRILOSEC) 20 MG DR capsule, Take 1 capsule (20 mg) by mouth daily, Disp: 90 capsule, Rfl: 0    pregabalin (LYRICA) 150 MG capsule, , Disp: , Rfl:     Tirzepatide (MOUNJARO) 7.5 MG/0.5ML SOAJ, Inject 0.5 mLs (7.5 mg) subcutaneously every 7 days., Disp: 2 mL, Rfl: 3    VENTOLIN  (90 Base) MCG/ACT inhaler, Inhale 1-2 puffs into the lungs every 6 hours as needed for shortness of breath, wheezing or cough, Disp: 18 g, Rfl: 1    vitamin D2 (ERGOCALCIFEROL) 40218 units (1250 mcg) capsule, Take 1 capsule (50,000 Units) by mouth once a week., Disp: 10 capsule, Rfl: 0    Wound Dressings ([facility] Center[facility] WOUND/BURN DRESSING) paste, Apply topically daily., Disp: 44 mL, Rfl: 1     ALLERGIES:  No Known Allergies     SOCIAL HISTORY:   Social History     Socioeconomic History    Marital status:      Spouse name: Not on file     Number of children: Not on file    Years of education: Not on file    Highest education level: Not on file   Occupational History    Not on file   Tobacco Use    Smoking status: Former     Current packs/day: 0.00     Types: Cigarettes     Quit date: 2013     Years since quittin.8    Smokeless tobacco: Never   Vaping Use    Vaping status: Never Used   Substance and Sexual Activity    Alcohol use: Never    Drug use: Never    Sexual activity: Not Currently     Partners: Female, Male     Birth control/protection: None   Other Topics Concern    Not on file   Social History Narrative    Not on file     Social Drivers of Health     Financial Resource Strain: High Risk (2024)    Financial Resource Strain     Within the past 12 months, have you or your family members you live with been unable to get utilities (heat, electricity) when it was really needed?: Yes   Food Insecurity: Low Risk  (2024)    Food Insecurity     Within the past 12 months, did you worry that your food would run out before you got money to buy more?: No     Within the past 12 months, did the food you bought just not last and you didn t have money to get more?: No   Transportation Needs: Low Risk  (2024)    Transportation Needs     Within the past 12 months, has lack of transportation kept you from medical appointments, getting your medicines, non-medical meetings or appointments, work, or from getting things that you need?: No   Physical Activity: Not on file   Stress: Not on file   Social Connections: Not on file   Interpersonal Safety: Low Risk  (2023)    Interpersonal Safety     Do you feel physically and emotionally safe where you currently live?: Yes     Within the past 12 months, have you been hit, slapped, kicked or otherwise physically hurt by someone?: No     Within the past 12 months, have you been humiliated or emotionally abused in other ways by your partner or ex-partner?: No   Housing Stability: Low Risk   (1/26/2024)    Housing Stability     Do you have housing? : Yes     Are you worried about losing your housing?: No        FAMILY HISTORY:   Family History   Adopted: Yes   Problem Relation Age of Onset    Glaucoma No family hx of     Macular Degeneration No family hx of         Vitals: There were no vitals taken for this visit.       Lower Extremity Evaluation:     Dermatologic: Skin is intact to both lower extremities without significant lesions, rash or abrasion.  No paronychia or evidence of soft tissue infection is noted.  The nails are hypertrophic and discolored bilateral feet.     Vascular: DP & PT pulses are intact & regular bilaterally.   Capillary filling and skin temperature is normal to both lower extremities.  There are no varicosities, no edema and no trophic changes noted.      Neurologic:   No evidence of weakness in the lower extremities.  Noted evidence of neuropathy with diminished sensation bilaterally.       Musculoskeletal: Patient is ambulatory without assistive device or brace.  No gross ankle deformity noted.  No foot or ankle joint effusion is noted.  One notes hammertoe contracture of the lesser toes bilaterally.    Labs:      Assessment:        ICD-10-CM    1. Acquired bilateral hammer toes  M20.41     M20.42       2. Type II diabetes mellitus with peripheral autonomic neuropathy (H)  E11.43               Plan:  I have explained to the patient the underlying condition affecting the feet.  At this point, ***.  The patient was given information on local certified foot care nursing services that can provide routine nail care.    There is low risk of morbidity with the procedure.  There was no overlap in work associated with the evaluation/management and the work associated with the procedure.    I have discussed with the patient the importance of diabetic foot care and daily inspection of the feet.  The patient was instructed to come in anytime if there is any concern about the feet with  redness, swelling or drainage is noted.    Bryant verbalized agreement with and understanding of the rational for the diagnosis and treatment plan.  All questions were answered to best of my ability and the patient's satisfaction. The patient was advised to contact the clinic with any questions that may arise after the clinic visit.      Disclaimer: This note consists of symbols derived from keyboarding, dictation and/or voice recognition software. As a result, there may be errors in the script that have gone undetected. Please consider this when interpreting information found in this chart.        NOBLE Odom D.P.M., F.A.C.F.A.S.           Again, thank you for allowing me to participate in the care of your patient.        Sincerely,        Esa Odom DPM

## 2024-11-08 NOTE — PROGRESS NOTES
PATIENT HISTORY:  Bryant Buck is a 64 year old male who presents to clinic for a diabetic foot evaluation.  *** The patient relates some numbness to the feet.  The patient denies any redness, swelling or open sores on both feet.  The patient relates blood sugars are within normal limits.  The patient was sent by  *** for consultation on the {RIGHT /LEFT:[id number]} foot.       REVIEW OF SYSTEMS:  Constitutional, HEENT, cardiovascular, pulmonary, GI, , musculoskeletal, neuro, skin, endocrine and psych systems are negative, except as otherwise noted.     PAST MEDICAL HISTORY:   Past Medical History:   Diagnosis Date    COPD exacerbation (H)     Coronary artery disease     Diabetes (H)     History of stroke     2-3 strokes    Hyperlipidemia     Hypertension     Myocardial infarction (H)     Neuropathy     Sleep apnea         PAST SURGICAL HISTORY: No past surgical history on file.     MEDICATIONS:   Current Outpatient Medications:     allopurinol (ZYLOPRIM) 100 MG tablet, Take 1 tablet (100 mg) by mouth daily, Disp: 90 tablet, Rfl: 1    aspirin 81 MG EC tablet, Take 81 mg by mouth daily, Disp: , Rfl:     atorvastatin (LIPITOR) 80 MG tablet, Take 1 tablet (80 mg) by mouth daily., Disp: 90 tablet, Rfl: 1    cephALEXin (KEFLEX) 500 MG capsule, Take 1 capsule (500 mg) by mouth 4 times daily., Disp: 40 capsule, Rfl: 0    Continuous Glucose Sensor (DEXCOM G7 SENSOR) MISC, Change every 10 days., Disp: 3 each, Rfl: 5    cyanocobalamin (CYANOCOBALAMIN) 1000 MCG/ML injection, , Disp: , Rfl:     DULoxetine (CYMBALTA) 60 MG capsule, Take 1 capsule (60 mg) by mouth daily, Disp: 90 capsule, Rfl: 0    empagliflozin (JARDIANCE) 25 MG TABS tablet, Take 1 tablet (25 mg) by mouth daily., Disp: 90 tablet, Rfl: 1    fenofibrate (TRICOR) 145 MG tablet, Take 1 tablet (145 mg) by mouth daily, Disp: 90 tablet, Rfl: 1    gabapentin (NEURONTIN) 400 MG capsule, Take 1 capsule (400 mg) by mouth 3 times daily, Disp: 90 capsule, Rfl: 1     glipiZIDE (GLUCOTROL XL) 5 MG 24 hr tablet, Take 2 tablets (10 mg) by mouth daily., Disp: 180 tablet, Rfl: 2    insulin aspart (NOVOLOG FLEXPEN) 100 UNIT/ML pen, INJECT 20 UNITS SUBQ THREE TIMES DAILY and AS NEEDED PER SLIDING SCALE. Max daily dose = 80 units daily, Disp: 90 mL, Rfl: 3    insulin glargine (LANTUS SOLOSTAR) 100 UNIT/ML pen, Inject 50 Units subcutaneously 2 times daily., Disp: 90 mL, Rfl: 3    insulin pen needle (SURE COMFORT PEN NEEDLES) 31G X 8 MM miscellaneous, USE TO INJECT INSULIN up to 6 times daily, Disp: 700 each, Rfl: 3    isosorbide mononitrate (IMDUR) 30 MG 24 hr tablet, Take 1 tablet (30 mg) by mouth daily, Disp: 90 tablet, Rfl: 1    loratadine (CLARITIN) 10 MG tablet, , Disp: , Rfl:     metFORMIN (GLUCOPHAGE) 1000 MG tablet, Take 1 tablet (1,000 mg) by mouth 2 times daily (with meals)., Disp: 180 tablet, Rfl: 1    omeprazole (PRILOSEC) 20 MG DR capsule, Take 1 capsule (20 mg) by mouth daily, Disp: 90 capsule, Rfl: 0    pregabalin (LYRICA) 150 MG capsule, , Disp: , Rfl:     Tirzepatide (MOUNJARO) 7.5 MG/0.5ML SOAJ, Inject 0.5 mLs (7.5 mg) subcutaneously every 7 days., Disp: 2 mL, Rfl: 3    VENTOLIN  (90 Base) MCG/ACT inhaler, Inhale 1-2 puffs into the lungs every 6 hours as needed for shortness of breath, wheezing or cough, Disp: 18 g, Rfl: 1    vitamin D2 (ERGOCALCIFEROL) 84236 units (1250 mcg) capsule, Take 1 capsule (50,000 Units) by mouth once a week., Disp: 10 capsule, Rfl: 0    Wound Dressings (MEDIHONEY WOUND/BURN DRESSING) paste, Apply topically daily., Disp: 44 mL, Rfl: 1     ALLERGIES:  No Known Allergies     SOCIAL HISTORY:   Social History     Socioeconomic History    Marital status:      Spouse name: Not on file    Number of children: Not on file    Years of education: Not on file    Highest education level: Not on file   Occupational History    Not on file   Tobacco Use    Smoking status: Former     Current packs/day: 0.00     Types: Cigarettes     Quit date:  2013     Years since quittin.8    Smokeless tobacco: Never   Vaping Use    Vaping status: Never Used   Substance and Sexual Activity    Alcohol use: Never    Drug use: Never    Sexual activity: Not Currently     Partners: Female, Male     Birth control/protection: None   Other Topics Concern    Not on file   Social History Narrative    Not on file     Social Drivers of Health     Financial Resource Strain: High Risk (2024)    Financial Resource Strain     Within the past 12 months, have you or your family members you live with been unable to get utilities (heat, electricity) when it was really needed?: Yes   Food Insecurity: Low Risk  (2024)    Food Insecurity     Within the past 12 months, did you worry that your food would run out before you got money to buy more?: No     Within the past 12 months, did the food you bought just not last and you didn t have money to get more?: No   Transportation Needs: Low Risk  (2024)    Transportation Needs     Within the past 12 months, has lack of transportation kept you from medical appointments, getting your medicines, non-medical meetings or appointments, work, or from getting things that you need?: No   Physical Activity: Not on file   Stress: Not on file   Social Connections: Not on file   Interpersonal Safety: Low Risk  (2023)    Interpersonal Safety     Do you feel physically and emotionally safe where you currently live?: Yes     Within the past 12 months, have you been hit, slapped, kicked or otherwise physically hurt by someone?: No     Within the past 12 months, have you been humiliated or emotionally abused in other ways by your partner or ex-partner?: No   Housing Stability: Low Risk  (2024)    Housing Stability     Do you have housing? : Yes     Are you worried about losing your housing?: No        FAMILY HISTORY:   Family History   Adopted: Yes   Problem Relation Age of Onset    Glaucoma No family hx of     Macular Degeneration  No family hx of         Vitals: There were no vitals taken for this visit.       Lower Extremity Evaluation:     Dermatologic: Skin is intact to both lower extremities without significant lesions, rash or abrasion.  No paronychia or evidence of soft tissue infection is noted.  The nails are hypertrophic and discolored bilateral feet.     Vascular: DP & PT pulses are intact & regular bilaterally.   Capillary filling and skin temperature is normal to both lower extremities.  There are no varicosities, no edema and no trophic changes noted.      Neurologic:   No evidence of weakness in the lower extremities.  Noted evidence of neuropathy with diminished sensation bilaterally.       Musculoskeletal: Patient is ambulatory without assistive device or brace.  No gross ankle deformity noted.  No foot or ankle joint effusion is noted.  One notes hammertoe contracture of the lesser toes bilaterally.    Labs:      Assessment:        ICD-10-CM    1. Acquired bilateral hammer toes  M20.41     M20.42       2. Type II diabetes mellitus with peripheral autonomic neuropathy (H)  E11.43               Plan:  I have explained to the patient the underlying condition affecting the feet.  At this point, ***.  The patient was given information on local certified foot care nursing services that can provide routine nail care.    There is low risk of morbidity with the procedure.  There was no overlap in work associated with the evaluation/management and the work associated with the procedure.    I have discussed with the patient the importance of diabetic foot care and daily inspection of the feet.  The patient was instructed to come in anytime if there is any concern about the feet with redness, swelling or drainage is noted.    Bryant verbalized agreement with and understanding of the rational for the diagnosis and treatment plan.  All questions were answered to best of my ability and the patient's satisfaction. The patient was advised to  contact the clinic with any questions that may arise after the clinic visit.      Disclaimer: This note consists of symbols derived from keyboarding, dictation and/or voice recognition software. As a result, there may be errors in the script that have gone undetected. Please consider this when interpreting information found in this chart.        NOBLE Odom D.P.M., [email or web address].F.STEPHENS.

## 2024-11-08 NOTE — PATIENT INSTRUCTIONS
"DIABETES AND YOUR FEET  Diabetes can result in several problems in the feet including ulcers (open sores) and amputations. Two of the most important reasons why people develop foot problems when they have diabetes is : 1. Neuropathy (loss of feeling)  2. Vascular disease (loss or decrease of blood flow).    Neuropathy is a term used to describe a loss of nerve function.  Patients with diabetes are at risk of developing neuropathy if their sugars continue to run high and are above the normal value. One theory for neuropathy is that the \"extra\" sugar in the body enters the nerves and is broken down. These by-products build up in the nerve causing it to swell and impairing nerve function. Often times, this can be prevented by controlling your sugars, dieting and exercise.    When a person develops neuropathy, they usually begin to feel numbness or tingling in their feet and sometime in their legs.  Other symptoms may include painful burning or hot feet, tingling or feeling like insects or ants are crawling on your feet or legs.  If the diabetes is sever and the sugars run high for long periods of time, neuropathy can also occur in the hands.    Vascular disease  is a term used to describe a loss or decrease in circulation (blood flow). There is a problem in getting blood and oxygen to areas that need it. Similar to neuropathy, sugars can build up in the walls of the arteries (blood vessels) and cause them to become swollen, thickened and hardened. This decreases the amount of blood that can go to an area that needs it. Though this is common in the legs of diabetic patients, it can also affect other arteries (blood vessels) in the body such as in the heart and eyes.    In the legs, vascular disease usually results in cramping. Patients who develop leg cramps after walking the same distance every time (i.e. One block, half a mile, ect.) need to let their doctors know so that their circulation may be checked. Cramps " "causing severe pain in the feet and/or legs while sleeping and the cramps go away when you stand or hang your legs off the side of the bed, may also be a sign of poor blood circulation.  Occasional cramping in cold weather or on rare occasions with activity may not be due to poor circulation, but you should inform your doctor.    PREVENTION OF THESE DISEASES  The key to prevention is good blood sugar control. Poor blood sugar control is a big reason many of these problems start. Physical activity (exercise) is a very good way to help decrease your blood sugars. Exercise can lower your blood sugar, blood pressure, and cholesterol. It also reduces your risk for heart disease and stroke, relieves stress, and strengthens your heart, muscles and bones.  In addition, regular activity helps insulin work better, improves your blood circulation, and keeps your joints flexible. If you're trying to lose weight, a combination of exercise and wise food choices can help you reach your target weight and maintain it.      PAIN MANAGEMENT (**Please speak with your primary doctor about any medications**)  1.Blood Sugar Control - Most important  2. Medications such as:  Amytriptylline, duloxetine, gabapentin, lyrica, tramadol (talk with your primary care doctor about this).     NUTRITION:  Nutrition is also important to help with healing. If your body does not have what it needs, it can't heal.   Increasing your protein intake is important.  With wounds you need 60-90gm of protein a day to help with healing. Over the counter protein shakes such as Chris, Glucerna, Ensure, ect... can help to supplement your daily protein intake.   It is also important to take Vitamins to help with healing.  Vitamins such as B12, B6 and Vitamin D3 are important for healing. These can be gotten over the counter at pharmacies or at stores like [employer] or the [employer].    I can also prescribe a dietary supplement called \"Rheumate\" that has a lot of " essential vitamins in one capsule.  This may not be covered by insurance though.     FOOT CARE RECOMMENDATIONS   1. Wash your feet with lukewarm water and a mild soap and then dry them thoroughly, especially between the toes.     2. Examine your feet daily looking for cuts, corns, blisters, cracks, ect, especially after wearing new shoes. Make sure to look between your toes. If you cannot see the bottom of your feet, set a mirror on the floor and hold your foot over it, or ask a spouse, friend or family member to examine your feet for you. Contact your doctor immediately if new problems are noted or if sores are not healing.     3. Immediately apply moisturizer to the tops and bottoms of your feet, avoiding areas between the toes. Hand lotion (Intesive Care, Brionna, Eucerin, Neutrogena, Curel, ect) is sufficient unless your doctor prescribes a medicated lotion. Apply sunscreen to your feet when going swimming outside.     4. Use clean comfortable shoes, wear white socks (if you have any bleeding or drainage, you will see it on white socks). Socks should not have thick seams or cut off the circulation around the leg. Break in new shoes slowly and rotate with older shoes until broken in. Check the inside of your shoes with your hand to look for areas of irritation or objects that may have fallen into your shoes.       5. Keep slippers by the side of your bed for use during the night.     6.  Shoes should be fitted by a professional and should not cause areas of irritation.  Check your feet regularly when wearing a new pair of shoes and replace them as needed.     7.  Talk to your doctor about proper exercise. Exercise and stretching stimulate blood flow to your feet and maintain proper glucose levels.     8.  Monitor your blood glucose level as instructed by your doctor. Notify your doctor immediately if your blood sugar is abnormally high or low.    9. Cut your nails straight across, but then gently round any sharp  edges with a cardboard nail file. If you have neuropathy, peripheral vascular disease or cannot see that well to trim your own toenails contact [facility] ([phone]) or [facility] ([phone]).      THINGS TO AVOID DOING   1.  Do not soak your feet if you have an open sore. Use only lukewarm water and always check the temperature with your hand as hot water can easily burn your feet.       2.  Never use a hot water bottle or heating pad on your feet. Also do not apply cold compresses to your feet. With decreased sensation, you could burn or freeze your feet.       3.  Do not apply any of these to your feet:    -  Over the counter medicine for corns or warts    -  Harsh chemicals like boric acid    -  Do not self-treat corns, cuts, blisters or infections. Always consult your doctor.       4.  Do not wear sandals, slippers or walk barefoot, especially on hot sand or concrete or other harsh surfaces.     5.  If you smoke, stop!!!

## 2024-11-08 NOTE — NURSING NOTE
"Chief Complaint   Patient presents with    Consult     Diabetic- discuss shoes       Initial BP (!) 153/89   Pulse 75   Ht 1.727 m (5' 8\")   Wt 100.7 kg (222 lb)   BMI 33.75 kg/m   Estimated body mass index is 33.75 kg/m  as calculated from the following:    Height as of this encounter: 1.727 m (5' 8\").    Weight as of this encounter: 100.7 kg (222 lb).  Medications and allergies reviewed.      Arlyn GUY MA    "

## 2024-12-05 ENCOUNTER — VIRTUAL VISIT (OUTPATIENT)
Dept: PHARMACY | Facility: CLINIC | Age: 64
End: 2024-12-05
Attending: INTERNAL MEDICINE
Payer: COMMERCIAL

## 2024-12-05 ENCOUNTER — TELEPHONE (OUTPATIENT)
Dept: CARDIOLOGY | Facility: CLINIC | Age: 64
End: 2024-12-05
Payer: COMMERCIAL

## 2024-12-05 DIAGNOSIS — E11.65 TYPE 2 DIABETES MELLITUS WITH HYPERGLYCEMIA, UNSPECIFIED WHETHER LONG TERM INSULIN USE (H): Primary | ICD-10-CM

## 2024-12-05 RX ORDER — TIRZEPATIDE 10 MG/.5ML
10 INJECTION, SOLUTION SUBCUTANEOUS
Qty: 2 ML | Refills: 3 | Status: SHIPPED | OUTPATIENT
Start: 2024-12-05

## 2024-12-05 NOTE — PROGRESS NOTES
Medication Therapy Management (MTM) Encounter    ASSESSMENT:                            Medication Adherence/Access: See below for considerations    Type 2 Diabetes: A1C above goal of < 7%.  Improvement in glycemic control since upgrading Ozempic to Mounjaro.  Given tolerating well, would benefit from dose increase today with subsequent reduction of insulin to mitigate risk of hypoglycemia.  Will continue to follow to titrate.  End goal is to remove as much insulin as possible and taper off glipizide as well.     PLAN:                            After 1 more week of Mounjaro 7.5 mg, increase to 10 mg weekly    When you make Mounjaro dose change, please decrease Lantus to 46 units 2 times daily and continue to adjust Humalog on your own as you have been.    Please let me know if you have any lows less than 70    Per request of Dr. Mays -- confirmed patient started 10-week course of 50,000 units of VitD weekly. Will transition to 1000 units D3 daily once completed.    Endocrine Team & Next Follow-Up:  2025 with Dr. Mays  2025 with Surendra    SUBJECTIVE/OBJECTIVE:                          Bryant Buck is a 64 year old male called for a follow-up visit. He was referred to me from Dr. Mays.      Reason for visit: Medication Therapy Management (MTM).      Allergies/ADRs: Reviewed in chart  Past Medical History: Reviewed in chart  Social History     Tobacco Use    Smoking status: Former     Current packs/day: 0.00     Types: Cigarettes     Quit date: 2013     Years since quittin.9    Smokeless tobacco: Never   Vaping Use    Vaping status: Never Used   Substance Use Topics    Alcohol use: Never    Drug use: Never        Medication Adherence/Access: Adherence is reflected well in the dispense report. [facility]    Type 2 Diabetes:   Diabetes Medication(s)       Biguanides       metFORMIN (GLUCOPHAGE) 1000 MG tablet Take 1 tablet (1,000 mg) by mouth 2 times daily (with meals)       Insulin       LANTUS  "SOLOSTAR 100 UNIT/ML soln Inject 50 Units Subcutaneous 2 times daily     NOVOLOG FLEXPEN 100 UNIT/ML soln INJECT 0-50 UNITS SUBQ THREE TIMES DAILY AS NEEDED PER SLIDING SCALE. Per patient, using less overall since starting Ozempic. Has been giving roughly 10 units once or twice per day        Sodium-Glucose Co-Transporter 2 (SGLT2) Inhibitors       empagliflozin (JARDIANCE) 25 MG TABS tablet Take 1 tablet (25 mg) by mouth daily       Sulfonylureas       glipiZIDE (GLUCOTROL XL) 5 MG 24 hr tablet Take 10 mg by mouth daily      Mounjaro 7.5 mg weekly -- no side effects. Switched from Ozempic earlier this year.        Lab Results   Component Value Date    GFRESTIMATED >90 10/07/2024    GFRESTIMATED >90 01/29/2024     BP Readings from Last 1 Encounters:   11/08/24 (!) 153/89     Pulse Readings from Last 1 Encounters:   11/08/24 75     Wt Readings from Last 1 Encounters:   11/08/24 222 lb (100.7 kg)     Ht Readings from Last 1 Encounters:   11/08/24 5' 8\" (1.727 m)     Estimated body mass index is 33.75 kg/m  as calculated from the following:    Height as of 11/8/24: 5' 8\" (1.727 m).    Weight as of 11/8/24: 222 lb (100.7 kg).    Temp Readings from Last 1 Encounters:   01/29/24 97.2  F (36.2  C) (Tympanic)       ----------------  I spent 15 minutes with this patient today. Dr. Mays was provided the recommendations above via routed note and is the authorizing prescriber for this visit through the pharmacist collaborative practice agreement. A copy of the visit note was provided to the patient's provider(s).    The patient was given to the patient a summary of these recommendations.     Surendra Hernandez, PharmD, [facility]  Endocrine & Diabetes [facility] Pharmacist  [address] [place] [id number]    Telemedicine Visit Details  Type of service:  Telephone visit  Start Time: 9AM  End Time: 9:15AM  Originating Location (pt. Location): Home  Provider has received verbal consent for a visit from the patient? Yes     Medication " Therapy Recommendations  Type 2 diabetes mellitus with hyperglycemia, unspecified whether long term insulin use (H)   1 Current Medication: Tirzepatide (MOUNJARO) 7.5 MG/0.5ML SOAJ (Discontinued)   Current Medication Sig: Inject 0.5 mLs (7.5 mg) subcutaneously every 7 days.   Rationale: Dose too low - Dosage too low - Effectiveness   Recommendation: Increase Dose   Status: Accepted per CPA   Identified Date: 12/5/2024 Completed Date: 12/5/2024

## 2024-12-05 NOTE — TELEPHONE ENCOUNTER
LVM Sent [facility] 12/5 to schedule    Return Cardiology  Dr Shannon  Any location  First available    Pinky Lindsay on 12/5/2024 at 4:39 PM

## 2025-01-02 ENCOUNTER — VIRTUAL VISIT (OUTPATIENT)
Dept: PHARMACY | Facility: CLINIC | Age: 65
End: 2025-01-02
Attending: INTERNAL MEDICINE
Payer: COMMERCIAL

## 2025-01-02 DIAGNOSIS — E11.65 TYPE 2 DIABETES MELLITUS WITH HYPERGLYCEMIA, UNSPECIFIED WHETHER LONG TERM INSULIN USE (H): Primary | ICD-10-CM

## 2025-01-02 RX ORDER — FAMOTIDINE 20 MG
1 TABLET ORAL DAILY
Qty: 90 CAPSULE | Refills: 3 | Status: SHIPPED | OUTPATIENT
Start: 2025-01-02

## 2025-01-02 RX ORDER — TIRZEPATIDE 12.5 MG/.5ML
12.5 INJECTION, SOLUTION SUBCUTANEOUS
Qty: 2 ML | Refills: 3 | Status: SHIPPED | OUTPATIENT
Start: 2025-01-02

## 2025-01-02 NOTE — PROGRESS NOTES
Medication Therapy Management (MTM) Encounter    ASSESSMENT:                            Medication Adherence/Access: See below for considerations    Type 2 Diabetes: A1C above goal of < 7%.  Improvement in glycemic control since upgrading Ozempic to Mounjaro.  Given tolerating well, would benefit from dose increase today with subsequent reduction of insulin to mitigate risk of hypoglycemia.  Will continue to follow to titrate.  End goal is to remove as much insulin as possible and taper off glipizide as well.     PLAN:                            After 2 more weeks of Mounjaro 10 mg, increase to 12.5 mg weekly    When you make Mounjaro dose change, please decrease Lantus to 22 units 2 times daily and continue to adjust Humalog on your own as you have been.    Please let me know if you have any lows less than 70    Per request of Dr. Mays -- confirmed patient started 10-week course of 50,000 units of VitD weekly. Will transition to 1000 units D3 daily once completed.    Endocrine Team & Next Follow-Up:  2025 with Dr. Mays  2025 with Surendra    SUBJECTIVE/OBJECTIVE:                          Bryant Buck is a 64 year old male called for a follow-up visit. He was referred to me from Dr. Mays.      Reason for visit: Medication Therapy Management (MTM).      Allergies/ADRs: Reviewed in chart  Past Medical History: Reviewed in chart  Social History     Tobacco Use    Smoking status: Former     Current packs/day: 0.00     Types: Cigarettes     Quit date: 2013     Years since quittin.0    Smokeless tobacco: Never   Vaping Use    Vaping status: Never Used   Substance Use Topics    Alcohol use: Never    Drug use: Never        Medication Adherence/Access: Adherence is reflected well in the dispense report. [facility]    Type 2 Diabetes:   Diabetes Medication(s)       Biguanides       metFORMIN (GLUCOPHAGE) 1000 MG tablet Take 1 tablet (1,000 mg) by mouth 2 times daily (with meals)       Insulin        "LANTUS SOLOSTAR 100 UNIT/ML soln Inject 25 Units Subcutaneous 2 times daily     NOVOLOG FLEXPEN 100 UNIT/ML soln INJECT 0-50 UNITS SUBQ THREE TIMES DAILY AS NEEDED PER SLIDING SCALE. Per patient, using less overall since starting Mounjaro -- maybe 10 units twice per week        Sodium-Glucose Co-Transporter 2 (SGLT2) Inhibitors       empagliflozin (JARDIANCE) 25 MG TABS tablet Take 1 tablet (25 mg) by mouth daily       Sulfonylureas       glipiZIDE (GLUCOTROL XL) 5 MG 24 hr tablet Take 10 mg by mouth daily      Mounjaro 10 mg weekly -- no side effects other than headache the first week of dose increase. Switched from Ozempic earlier this year.          Lab Results   Component Value Date    GFRESTIMATED >90 10/07/2024    GFRESTIMATED >90 01/29/2024     BP Readings from Last 1 Encounters:   11/08/24 (!) 153/89     Pulse Readings from Last 1 Encounters:   11/08/24 75     Wt Readings from Last 1 Encounters:   11/08/24 222 lb (100.7 kg)     Ht Readings from Last 1 Encounters:   11/08/24 5' 8\" (1.727 m)     Estimated body mass index is 33.75 kg/m  as calculated from the following:    Height as of 11/8/24: 5' 8\" (1.727 m).    Weight as of 11/8/24: 222 lb (100.7 kg).    Temp Readings from Last 1 Encounters:   01/29/24 97.2  F (36.2  C) (Tympanic)       ----------------  I spent 15 minutes with this patient today. Dr. Mays was provided the recommendations above via routed note and is the authorizing prescriber for this visit through the pharmacist collaborative practice agreement. A copy of the visit note was provided to the patient's provider(s).    The patient was given to the patient a summary of these recommendations.     Surendra Hernandez, PharmD, [facility]  Endocrine & Diabetes [facility] Pharmacist  [address] [place] [phone]    Telemedicine Visit Details  Type of service:  Telephone visit  Start Time: 9AM  End Time: 9:15AM  Originating Location (pt. Location): Home  Provider has received verbal consent for a visit " from the patient? Yes     Medication Therapy Recommendations  Type 2 diabetes mellitus with hyperglycemia, unspecified whether long term insulin use (H)   1 Current Medication: MOUNJARO 10 MG/0.5ML SOAJ (Discontinued)   Current Medication Sig: Inject 0.5 mLs (10 mg) subcutaneously every 7 days.   Rationale: Dose too low - Dosage too low - Effectiveness   Recommendation: Increase Dose   Status: Accepted per CPA   Identified Date: 1/2/2025 Completed Date: 1/2/2025

## 2025-01-19 ENCOUNTER — HEALTH MAINTENANCE LETTER (OUTPATIENT)
Age: 65
End: 2025-01-19

## 2025-02-05 ENCOUNTER — OFFICE VISIT (OUTPATIENT)
Dept: OPHTHALMOLOGY | Facility: CLINIC | Age: 65
End: 2025-02-05
Payer: COMMERCIAL

## 2025-02-05 DIAGNOSIS — H25.813 COMBINED FORMS OF AGE-RELATED CATARACT OF BOTH EYES: Primary | ICD-10-CM

## 2025-02-05 PROCEDURE — 92004 COMPRE OPH EXAM NEW PT 1/>: CPT | Performed by: STUDENT IN AN ORGANIZED HEALTH CARE EDUCATION/TRAINING PROGRAM

## 2025-02-05 ASSESSMENT — VISUAL ACUITY
METHOD: SNELLEN - LINEAR
OD_SC: 20/60
OD_PH_SC: 20/40
OS_SC: CF @5'

## 2025-02-05 ASSESSMENT — EXTERNAL EXAM - RIGHT EYE: OD_EXAM: NORMAL

## 2025-02-05 ASSESSMENT — TONOMETRY
OD_IOP_MMHG: 15
IOP_METHOD: APPLANATION
OS_IOP_MMHG: 15

## 2025-02-05 ASSESSMENT — REFRACTION_MANIFEST
OD_SPHERE: -2.25
OD_AXIS: 180
OD_CYLINDER: +1.00
OD_ADD: +2.75

## 2025-02-05 ASSESSMENT — EXTERNAL EXAM - LEFT EYE: OS_EXAM: NORMAL

## 2025-02-05 ASSESSMENT — CUP TO DISC RATIO
OS_RATIO: 0.3
OD_RATIO: 0.45

## 2025-02-05 ASSESSMENT — SLIT LAMP EXAM - LIDS
COMMENTS: 1+ DERMATOCHALASIS, 2+ BLEPHARITIS
COMMENTS: 1+ DERMATOCHALASIS, 2+ BLEPHARITIS

## 2025-02-05 NOTE — PATIENT INSTRUCTIONS
Offered cataract surgery of the left eye at [facility]      Surgery Date(s): March 31, 2025 for your left eye        Our surgery schedulers will mail your appointments to you     Zainab Tran MD  [phone]

## 2025-02-05 NOTE — LETTER
2/5/2025      Bryant Buck  [id number] [address] [address]      Dear Colleague,    Thank you for referring your patient, Bryant Buck, to the [facility]. Please see a copy of my visit note below.     Current Eye Medications:  none.       Subjective:  Dr. Dmitriy Valadez recommended a cataract evaluation with Dr. Tran.  The patient complains of decreasing vision in his left eye, distance and near.  He describes it as a rapid decrease in vision since his exam one year ago.  He has prescription glasses, but feels they are no longer helping.  He did not bring his glasses today.    No history of eye injuries or surgery.  He is here with his wife - she is legally blind (Stargardt disease)    Lab Results   Component Value Date    A1C 7.2 10/07/2024    A1C 9.3 01/29/2024         Objective:  See Ophthalmology Exam.       Assessment:  Bryant Buck is a 64 year old male who presents with:   Encounter Diagnosis   Name Primary?     Combined forms of age-related cataract of both eyes Visually significant cataract left eye. Recommend CE/IOL left eye. Dil 6. Trypan. Anticipate targeting mild myopia left eye.     Visually significant cataract that is interfering with daily activities of living. Plan for cataract extraction and intraocular lens implant left eye.  Risks, benefits, complications, and alternatives discussed with patient including possibility of limitations from coexistent eye disease and loss of vision. Target refraction and lens options discussed.  Patient understands and wishes to proceed with surgery.     Plan:  Offered cataract surgery of the left eye at [facility]      Surgery Date(s): March 31, 2025 for your left eye        Our surgery schedulers will mail your appointments to you     Zainab Tran MD  [phone]       Again, thank you for allowing me to participate in the care of your patient.        Sincerely,        Zainab Tran,  MD    Electronically signed

## 2025-02-05 NOTE — PROGRESS NOTES
Current Eye Medications:  none.       Subjective:  Dr. Dmitriy Valadez recommended a cataract evaluation with Dr. Tran.  The patient complains of decreasing vision in his left eye, distance and near.  He describes it as a rapid decrease in vision since his exam one year ago.  He has prescription glasses, but feels they are no longer helping.  He did not bring his glasses today.    No history of eye injuries or surgery.  He is here with his wife - she is legally blind (Stargardt disease)    Lab Results   Component Value Date    A1C 7.2 10/07/2024    A1C 9.3 01/29/2024      No previous eye injuries or surgeries.      Objective:  See Ophthalmology Exam.       Assessment:  Bryant Buck is a 64 year old male who presents with:   Encounter Diagnosis   Name Primary?    Combined forms of age-related cataract of both eyes Visually significant cataract left eye. Recommend CE/IOL left eye. Dil 6. Trypan. Anticipate targeting mild myopia left eye.     Visually significant cataract that is interfering with daily activities of living. Plan for cataract extraction and intraocular lens implant left eye.  Risks, benefits, complications, and alternatives discussed with patient including possibility of limitations from coexistent eye disease and loss of vision. Target refraction and lens options discussed.  Patient understands and wishes to proceed with surgery.     Plan:  Offered cataract surgery of the left eye at [facility]      Surgery Date(s): March 31, 2025 for your left eye        Our surgery schedulers will mail your appointments to you     Zainab Tran MD  [phone]

## 2025-02-12 ENCOUNTER — TELEPHONE (OUTPATIENT)
Dept: OTOLARYNGOLOGY | Facility: CLINIC | Age: 65
End: 2025-02-12

## 2025-02-12 ENCOUNTER — VIRTUAL VISIT (OUTPATIENT)
Dept: PHARMACY | Facility: CLINIC | Age: 65
End: 2025-02-12
Attending: INTERNAL MEDICINE
Payer: COMMERCIAL

## 2025-02-12 ENCOUNTER — PREP FOR PROCEDURE (OUTPATIENT)
Dept: OPHTHALMOLOGY | Facility: CLINIC | Age: 65
End: 2025-02-12
Payer: COMMERCIAL

## 2025-02-12 ENCOUNTER — HOSPITAL ENCOUNTER (OUTPATIENT)
Facility: AMBULATORY SURGERY CENTER | Age: 65
End: 2025-02-12
Attending: STUDENT IN AN ORGANIZED HEALTH CARE EDUCATION/TRAINING PROGRAM | Admitting: STUDENT IN AN ORGANIZED HEALTH CARE EDUCATION/TRAINING PROGRAM
Payer: COMMERCIAL

## 2025-02-12 DIAGNOSIS — H25.812 COMBINED FORM OF AGE-RELATED CATARACT, LEFT EYE: Primary | ICD-10-CM

## 2025-02-12 DIAGNOSIS — E11.65 TYPE 2 DIABETES MELLITUS WITH HYPERGLYCEMIA, UNSPECIFIED WHETHER LONG TERM INSULIN USE (H): Primary | ICD-10-CM

## 2025-02-12 RX ORDER — ACYCLOVIR 400 MG/1
TABLET ORAL
Qty: 9 EACH | Refills: 5 | Status: SHIPPED | OUTPATIENT
Start: 2025-02-12

## 2025-02-12 RX ORDER — TIRZEPATIDE 15 MG/.5ML
15 INJECTION, SOLUTION SUBCUTANEOUS
Qty: 6 ML | Refills: 3 | Status: SHIPPED | OUTPATIENT
Start: 2025-02-12

## 2025-02-12 NOTE — PATIENT INSTRUCTIONS
Increase Mounjaro to 15 mg weekly     When you make Mounjaro dose change, please decrease Lantus to 20 units 2 times daily and continue to only use Novolog as needed.    Please continue to decrease your Lantus doses by 2 units every 3 days if morning fasting sugars are consistently less than 100.     Please let me know if you have any lows less than 70    Endocrine Team & Next Follow-Up:  2/26/2025 with Dr. Mays  3/18/2025 with Surendra

## 2025-02-12 NOTE — PROGRESS NOTES
Medication Therapy Management (MTM) Encounter    ASSESSMENT:                            Medication Adherence/Access: See below for considerations    Type 2 Diabetes: A1C above goal of < 7%.  Improvement in glycemic control since upgrading Ozempic to Mounjaro.  Given tolerating well, would benefit from dose increase today with subsequent reduction of insulin to mitigate risk of hypoglycemia.  Will continue to follow to titrate.  End goal is to remove as much insulin as possible, ideally removing completely.     Additionally, patient completed 2-month course of high-dose vitamin D and started taking 1000 units D3 daily as maintenance -- may benefit from repeat lab at next visit with Dr. Mays.     PLAN:                            Increase Mounjaro to 15 mg weekly     When you make Mounjaro dose change, please decrease Lantus to 20 units 2 times daily and continue to only use Novolog as needed.    Please continue to decrease your Lantus doses by 2 units every 3 days if morning fasting sugars are consistently less than 100.     Please let me know if you have any lows less than 70    Consider Vitamin D recheck next visit with Dr. Mays    Endocrine Team & Next Follow-Up:  2/26/2025 with Dr. Mays  3/18/2025 with Surendra    SUBJECTIVE/OBJECTIVE:                          Bryant Buck is a 64 year old male called for a follow-up visit. He was referred to me from Dr. Mays.      Reason for visit: Medication Therapy Management (MTM).      Allergies/ADRs: Reviewed in chart  Past Medical History: Reviewed in chart  Social History     Tobacco Use    Smoking status: Former     Current packs/day: 0.00     Average packs/day: 2.0 packs/day for 41.0 years (82.0 ttl pk-yrs)     Types: Cigarettes     Start date: 1/9/1972     Quit date: 6/7/2014     Years since quitting: 10.6    Smokeless tobacco: Never   Vaping Use    Vaping status: Never Used   Substance Use Topics    Alcohol use: Never    Drug use: Never        Medication  "Adherence/Access: Adherence is reflected well in the dispense report. are PMAP    Type 2 Diabetes:   Diabetes Medication(s)    Metformin 1000 mg twice daily   Jardiance 25 mg daily  Lantus 22 units twice daily -- patient prefers to give twice daily vs once daily (recommended to consolidate to daily in prior visit)  Novolog - 4-10 units \"a few times per week\" for high carb meals  Mounjaro 12.5 mg weekly     No reports of side effects/concerns.    Stopped glipizide per PCP earlier this month. Switched Ozempic to Mounjaro last year.        Per patient, lows may be sensor error (not symptomatic). Encouraged to verify with fingerstick next time.    Estimated body mass index is 33.3 kg/m  as calculated from the following:    Height as of 2/7/25: 5' 8\" (1.727 m).    Weight as of 2/7/25: 219 lb (99.3 kg).    10/7/24 patient had vitamin D level of 6, started 2-month course of D2 50,000 weekly and transitioned to 1000 units D3 daily. Tolerating well.    ----------------  I spent 15 minutes with this patient today. Dr. Mays was provided the recommendations above via routed note and is the authorizing prescriber for this visit through the pharmacist collaborative practice agreement. A copy of the visit note was provided to the patient's provider(s).    The patient was given to the patient a summary of these recommendations.     Surendra Hernandez, PharmD, [facility]  Endocrine & Diabetes [facility] Pharmacist  [address] [place] [phone]    Telemedicine Visit Details  Type of service:  Telephone visit  Start Time: 9AM  End Time: 9:15AM  Originating Location (pt. Location): Home  Provider has received verbal consent for a visit from the patient? Yes     Medication Therapy Recommendations  Type 2 diabetes mellitus with hyperglycemia, unspecified whether long term insulin use (H)   1 Current Medication: MOUNJARO 12.5 MG/0.5ML SOAJ (Discontinued)   Current Medication Sig: Inject 0.5 mLs (12.5 mg) subcutaneously every 7 days. "   Rationale: Dose too low - Dosage too low - Effectiveness   Recommendation: Increase Dose   Status: Accepted per CPA   Identified Date: 2/12/2025 Completed Date: 2/12/2025

## 2025-02-12 NOTE — TELEPHONE ENCOUNTER
Type of surgery: LEFT PHACOEMULSIFICATION, CATARACT, WITH INTRAOCULAR LENS IMPLANT   Location of surgery: [facility]  Date and time of surgery: 03/31/2025  Surgeon: SACHA   Pre-Op Appt Date: TBMALENA  Post-Op Appt Date: 04/01/2025   Packet sent out: No  Pre-cert/Authorization completed:  No  Date:

## 2025-03-11 RX ORDER — PROPARACAINE HYDROCHLORIDE 5 MG/ML
1 SOLUTION/ DROPS OPHTHALMIC ONCE
OUTPATIENT
Start: 2025-03-11 | End: 2025-03-11

## 2025-03-11 RX ORDER — CYCLOPENTOLAT/TROPIC/PHENYLEPH 1%-1%-2.5%
1 DROPS (EA) OPHTHALMIC (EYE)
OUTPATIENT
Start: 2025-03-11

## 2025-03-18 ENCOUNTER — VIRTUAL VISIT (OUTPATIENT)
Dept: PHARMACY | Facility: CLINIC | Age: 65
End: 2025-03-18
Attending: INTERNAL MEDICINE
Payer: COMMERCIAL

## 2025-03-18 DIAGNOSIS — E11.65 TYPE 2 DIABETES MELLITUS WITH HYPERGLYCEMIA, UNSPECIFIED WHETHER LONG TERM INSULIN USE (H): Primary | ICD-10-CM

## 2025-03-18 RX ORDER — ACYCLOVIR 400 MG/1
TABLET ORAL
Qty: 9 EACH | Refills: 5 | Status: SHIPPED | OUTPATIENT
Start: 2025-03-18

## 2025-03-18 RX ORDER — TIRZEPATIDE 15 MG/.5ML
15 INJECTION, SOLUTION SUBCUTANEOUS
Qty: 6 ML | Refills: 3 | Status: SHIPPED | OUTPATIENT
Start: 2025-03-18

## 2025-03-18 RX ORDER — PEN NEEDLE, DIABETIC 31 GX3/16"
NEEDLE, DISPOSABLE MISCELLANEOUS
Qty: 700 EACH | Refills: 3 | Status: SHIPPED | OUTPATIENT
Start: 2025-03-18

## 2025-03-18 RX ORDER — INSULIN ASPART 100 [IU]/ML
INJECTION, SOLUTION INTRAVENOUS; SUBCUTANEOUS
Qty: 90 ML | Refills: 3 | Status: SHIPPED | OUTPATIENT
Start: 2025-03-18

## 2025-03-18 RX ORDER — INSULIN GLARGINE 100 [IU]/ML
50 INJECTION, SOLUTION SUBCUTANEOUS 2 TIMES DAILY
Qty: 90 ML | Refills: 3 | Status: SHIPPED | OUTPATIENT
Start: 2025-03-18

## 2025-03-18 RX ORDER — ATORVASTATIN CALCIUM 80 MG/1
80 TABLET, FILM COATED ORAL DAILY
Qty: 90 TABLET | Refills: 3 | Status: SHIPPED | OUTPATIENT
Start: 2025-03-18

## 2025-03-18 NOTE — PROGRESS NOTES
Medication Therapy Management (MTM) Encounter    ASSESSMENT:                            Medication Adherence/Access: See below for considerations     Type 2 Diabetes: A1C above goal of < 7%.  Improvement in glycemic control since upgrading Ozempic to Mounjaro. Tolerating the 15 mg dose well, so will continue current therapy at this time. Patient will continue to self-taper Lantus and use Novolog only as needed.      Patient moving to [place] unexpectedly  -- will help bridge care until he finds new provider in the coming months.    PLAN:                            Continue Mounjaro to 15 mg weekly     Please continue to decrease your Lantus doses by 2 units every 3 days if morning fasting sugars are consistently less than 100    Please let me know if you have any lows less than 70    Resent prescriptions prescribed by endocrine to [place]. Patient will find new provider in [place], but I will help bridge his care for the next few months until he finds new provider.     Endocrine Team & Next Follow-Up:  No follow-up scheduled with Dr. Mays yet  5/13/2025 with Surendra    SUBJECTIVE/OBJECTIVE:                          Bryant Buck is a 64 year old male called for a follow-up visit. He was referred to me from Dr. Mays.      Reason for visit: Medication Therapy Management (MTM).      Allergies/ADRs: Reviewed in chart  Past Medical History: Reviewed in chart  Social History     Tobacco Use    Smoking status: Former     Current packs/day: 0.00     Average packs/day: 2.0 packs/day for 41.0 years (82.0 ttl pk-yrs)     Types: Cigarettes     Start date: 1/9/1972     Quit date: 6/7/2014     Years since quitting: 10.7    Smokeless tobacco: Never   Vaping Use    Vaping status: Never Used   Substance Use Topics    Alcohol use: Never    Drug use: Never        Medication Adherence/Access: Adherence is reflected well in the dispense report. [facility]    Type 2 Diabetes:   Diabetes Medication(s)    Metformin 1000 mg twice daily  "  Jardiance 25 mg daily  Lantus 20 units twice daily -- patient prefers to give twice daily vs once daily (recommended to consolidate to daily in prior visit)  Novolog - 4-10 units \"a few times per week\" for high carb meals  Mounjaro 15 mg weekly     No reports of side effects/concerns.    Stopped glipizide per PCP earlier this year. Switched Ozempic to Mounjaro last year.          Estimated body mass index is 33.3 kg/m  as calculated from the following:    Height as of 2/7/25: 5' 8\" (1.727 m).    Weight as of 2/7/25: 219 lb (99.3 kg).    10/7/24 patient had vitamin D level of 6, started 2-month course of D2 50,000 weekly and transitioned to 1000 units D3 daily. Tolerating well.    ----------------  I spent 15 minutes with this patient today. Dr. Mays was provided the recommendations above via routed note and is the authorizing prescriber for this visit through the pharmacist collaborative practice agreement. A copy of the visit note was provided to the patient's provider(s).    The patient was given to the patient a summary of these recommendations.     Surendra Hernandez, PharmD, [facility]  Endocrine & Diabetes [facility] Pharmacist  [address] [place] [phone]    Telemedicine Visit Details  Type of service:  Telephone visit  Start Time: 9AM  End Time: 9:15AM  Originating Location (pt. Location): Home  Provider has received verbal consent for a visit from the patient? Yes     Medication Therapy Recommendations  No medication therapy recommendations to display                      "

## 2025-03-18 NOTE — PATIENT INSTRUCTIONS
Continue Mounjaro to 15 mg weekly     Please continue to decrease your Lantus doses by 2 units every 3 days if morning fasting sugars are consistently less than 100    Please let me know if you have any lows less than 70    Resent prescriptions prescribed by endocrine to [place]. Patient will find new provider in [place], but I will help bridge his care for the next few months until he finds new provider.     Endocrine Team & Next Follow-Up:  No follow-up scheduled with Dr. Mays yet  5/13/2025 with Surendra

## 2025-05-18 ENCOUNTER — HEALTH MAINTENANCE LETTER (OUTPATIENT)
Age: 65
End: 2025-05-18

## 2025-07-14 ENCOUNTER — VIRTUAL VISIT (OUTPATIENT)
Dept: PHARMACY | Facility: CLINIC | Age: 65
End: 2025-07-14
Attending: INTERNAL MEDICINE
Payer: COMMERCIAL

## 2025-07-14 NOTE — PROGRESS NOTES
Medication Therapy Management (MTM) Encounter    ASSESSMENT:                            Medication Adherence/Access: See below for considerations     Type 2 Diabetes: A1C above goal of < 7%.  Improvement in glycemic control since upgrading Ozempic to Mounjaro. Tolerating the 15 mg dose well, so will continue current therapy at this time. Patient will continue to self-taper Lantus and use Novolog only as needed.      Patient moving to [place] unexpectedly  -- will help bridge care until he finds new provider in the coming months.    PLAN:                            Continue Mounjaro to 15 mg weekly     Please continue to decrease your Lantus doses by 2 units every 3 days if morning fasting sugars are consistently less than 100    Please let me know if you have any lows less than 70    Resent prescriptions prescribed by endocrine to [place]. Patient will find new provider in [place], but I will help bridge his care for the next few months until he finds new provider.     Endocrine Team & Next Follow-Up:  No follow-up scheduled with Dr. Mays yet  2025 with Surendra    SUBJECTIVE/OBJECTIVE:                          Bryant Buck is a 64 year old male called for a follow-up visit. He was referred to me from Dr. Mays.      Reason for visit: Medication Therapy Management (MTM).      Allergies/ADRs: Reviewed in chart  Past Medical History: Reviewed in chart  Social History     Tobacco Use    Smoking status: Former     Current packs/day: 0.00     Average packs/day: 2.0 packs/day for 41.0 years (82.0 ttl pk-yrs)     Types: Cigarettes     Start date: 1972     Quit date: 2014     Years since quittin.1    Smokeless tobacco: Never   Vaping Use    Vaping status: Never Used   Substance Use Topics    Alcohol use: Never    Drug use: Never        Medication Adherence/Access: Adherence is reflected well in the dispense report. [facility]    Type 2 Diabetes:   Diabetes Medication(s)    Metformin 1000 mg twice daily  "  Jardiance 25 mg daily  Lantus 20 units twice daily -- patient prefers to give twice daily vs once daily (recommended to consolidate to daily in prior visit)  Novolog - 4-10 units \"a few times per week\" for high carb meals  Mounjaro 15 mg weekly     No reports of side effects/concerns.    Stopped glipizide per PCP earlier this year. Switched Ozempic to Mounjaro last year.          Estimated body mass index is 33.3 kg/m  as calculated from the following:    Height as of 2/7/25: 5' 8\" (1.727 m).    Weight as of 2/7/25: 219 lb (99.3 kg).    ----------------  I spent 15 minutes with this patient today. Dr. Mays was provided the recommendations above via routed note and is the authorizing prescriber for this visit through the pharmacist collaborative practice agreement. A copy of the visit note was provided to the patient's provider(s).    The patient was given to the patient a summary of these recommendations.     Surendra Hernandez, PharmD, [facility]  Endocrine & Diabetes [facility] Pharmacist  [address] [place] [phone]    Telemedicine Visit Details  Type of service:  Telephone visit  Start Time: 9AM  End Time: 9:15AM  Originating Location (pt. Location): Home  Provider has received verbal consent for a visit from the patient? Yes     Medication Therapy Recommendations  No medication therapy recommendations to display                      "

## 2025-08-25 ENCOUNTER — TELEPHONE (OUTPATIENT)
Dept: ENDOCRINOLOGY | Facility: CLINIC | Age: 65
End: 2025-08-25
Payer: COMMERCIAL

## 2025-08-31 ENCOUNTER — HEALTH MAINTENANCE LETTER (OUTPATIENT)
Age: 65
End: 2025-08-31

## (undated) RX ORDER — REGADENOSON 0.08 MG/ML
INJECTION, SOLUTION INTRAVENOUS
Status: DISPENSED
Start: 2024-01-12